# Patient Record
Sex: FEMALE | Race: WHITE | NOT HISPANIC OR LATINO | Employment: UNEMPLOYED | URBAN - METROPOLITAN AREA
[De-identification: names, ages, dates, MRNs, and addresses within clinical notes are randomized per-mention and may not be internally consistent; named-entity substitution may affect disease eponyms.]

---

## 2018-03-01 ENCOUNTER — OFFICE VISIT (OUTPATIENT)
Dept: FAMILY MEDICINE CLINIC | Facility: MEDICAL CENTER | Age: 33
End: 2018-03-01
Payer: COMMERCIAL

## 2018-03-01 ENCOUNTER — TRANSCRIBE ORDERS (OUTPATIENT)
Dept: ADMINISTRATIVE | Facility: HOSPITAL | Age: 33
End: 2018-03-01

## 2018-03-01 VITALS
WEIGHT: 128 LBS | DIASTOLIC BLOOD PRESSURE: 70 MMHG | RESPIRATION RATE: 16 BRPM | SYSTOLIC BLOOD PRESSURE: 100 MMHG | HEART RATE: 68 BPM

## 2018-03-01 DIAGNOSIS — R07.9 CHEST PAIN, UNSPECIFIED TYPE: Primary | ICD-10-CM

## 2018-03-01 DIAGNOSIS — H92.03 EAR PAIN, BILATERAL: ICD-10-CM

## 2018-03-01 DIAGNOSIS — R22.41 MASS OF LEG, RIGHT: ICD-10-CM

## 2018-03-01 DIAGNOSIS — R22.41 LEG MASS, RIGHT: Primary | ICD-10-CM

## 2018-03-01 PROBLEM — F41.8 DEPRESSION WITH ANXIETY: Status: ACTIVE | Noted: 2018-03-01

## 2018-03-01 PROBLEM — J30.89 ENVIRONMENTAL AND SEASONAL ALLERGIES: Status: ACTIVE | Noted: 2018-03-01

## 2018-03-01 PROCEDURE — 93000 ELECTROCARDIOGRAM COMPLETE: CPT | Performed by: FAMILY MEDICINE

## 2018-03-01 PROCEDURE — 99203 OFFICE O/P NEW LOW 30 MIN: CPT | Performed by: FAMILY MEDICINE

## 2018-03-01 RX ORDER — FLUOXETINE HYDROCHLORIDE 20 MG/1
30 CAPSULE ORAL
COMMUNITY
Start: 2018-02-17

## 2018-03-01 RX ORDER — FLUOXETINE 10 MG/1
CAPSULE ORAL
COMMUNITY
Start: 2018-02-17 | End: 2019-09-25 | Stop reason: HOSPADM

## 2018-03-01 RX ORDER — ALPRAZOLAM 1 MG/1
TABLET ORAL
COMMUNITY
Start: 2018-01-21

## 2018-03-01 NOTE — PROGRESS NOTES
Assessment/Plan:    No problem-specific Assessment & Plan notes found for this encounter  Diagnoses and all orders for this visit:    Chest pain, unspecified type  -     POCT ECG  -     Comprehensive metabolic panel; Future  -     Lipid Panel with Direct LDL reflex; Future  -     TSH, 3rd generation with T4 reflex; Future  -     Ambulatory referral to Cardiology; Future    Etiology unclear  In office EKG was not revealing  Check labs  Since patient is getting this pain which radiates to her left shoulder and neck while running I did recommend she see Cardiology for evaluation and she agreed  Ear pain, bilateral  Likely due to patient's underlying allergies  She is already on an oral antihistamine  I recommended she add a nasal steroid as well  Mass of leg, right  -     US MSK limited  Etiology unclear  Check ultrasound  May need to see surgery depending on ultrasound results  Other orders  -     FLUoxetine (PROzac) 10 mg capsule;   -     FLUoxetine (PROzac) 20 mg capsule;   -     ALPRAZolam (XANAX) 1 mg tablet; Follow-up in three months or sooner if needed  Subjective:      Patient ID: Darian Dempsey is a 29 y o  female  Patient presents to establish care  Has ear pain  Location is bilateral ears  Has been occurring for the past 1-2 months  She does take loratadine daily for allergies and does feel it helps  She is worried she has an infection  Denies sore throat  Denies cough  Denies runny nose  Has a lump on her right upper leg  She found it last night  It is small but painful  She states there is no skin changes but she can feel it when she presses down on her leg  Also has chest pain  Started about one year ago  It is centralized chest pain that goes into the left shoulder and up the neck  It only happens when she jogs  She is not jog all winter and therefore has not had the pain recently  Patient does smoke a few cigarettes per week  No history of heart troubles  Has not had labs lately  Patient is followed by Psychiatry for depression and anxiety  The following portions of the patient's history were reviewed and updated as appropriate: allergies, current medications, past family history, past medical history, past social history, past surgical history and problem list     Review of Systems   Constitutional: Negative for fever  Respiratory: Negative for shortness of breath  Cardiovascular: Positive for chest pain  Gastrointestinal: Negative for abdominal pain and blood in stool  Genitourinary: Negative for dysuria  Musculoskeletal: Negative for arthralgias and myalgias  Skin: Negative for rash  Neurological: Negative for headaches  Objective:      /70 (Cuff Size: Standard)   Pulse 68   Resp 16   Wt 58 1 kg (128 lb)          Physical Exam   Constitutional: She is oriented to person, place, and time  Vital signs are normal  She appears well-developed and well-nourished  HENT:   Head: Normocephalic and atraumatic  Right Ear: Tympanic membrane, external ear and ear canal normal    Left Ear: Tympanic membrane, external ear and ear canal normal    Nose: Mucosal edema and rhinorrhea present  No nasal deformity  Mouth/Throat: Uvula is midline and mucous membranes are normal    B/L TM air/fluid level visualized  Post Nasal Drainage  Eyes: Conjunctivae, EOM and lids are normal  Pupils are equal, round, and reactive to light  B/L Allergic Shiners  Neck: Trachea normal  Neck supple  No thyromegaly present  Cardiovascular: Normal rate, regular rhythm, S1 normal, S2 normal, normal heart sounds and normal pulses  No murmur heard  Pulmonary/Chest: Effort normal and breath sounds normal    Musculoskeletal:        Legs:  Lymphadenopathy:     She has no cervical adenopathy  Right cervical: No superficial cervical and no posterior cervical adenopathy present         Left cervical: No superficial cervical and no posterior cervical adenopathy present  Neurological: She is alert and oriented to person, place, and time  Skin: Skin is warm and dry     Psychiatric: Her speech is normal and behavior is normal  Judgment and thought content normal  Cognition and memory are normal

## 2018-03-06 ENCOUNTER — APPOINTMENT (OUTPATIENT)
Dept: LAB | Facility: MEDICAL CENTER | Age: 33
End: 2018-03-06
Payer: COMMERCIAL

## 2018-03-06 ENCOUNTER — HOSPITAL ENCOUNTER (OUTPATIENT)
Dept: RADIOLOGY | Facility: MEDICAL CENTER | Age: 33
Discharge: HOME | End: 2018-03-06
Payer: COMMERCIAL

## 2018-03-06 DIAGNOSIS — R07.9 CHEST PAIN, UNSPECIFIED TYPE: ICD-10-CM

## 2018-03-06 LAB
ALBUMIN SERPL BCP-MCNC: 4.3 G/DL (ref 3.5–5)
ALP SERPL-CCNC: 38 U/L (ref 46–116)
ALT SERPL W P-5'-P-CCNC: 16 U/L (ref 12–78)
ANION GAP SERPL CALCULATED.3IONS-SCNC: 6 MMOL/L (ref 4–13)
AST SERPL W P-5'-P-CCNC: 17 U/L (ref 5–45)
BILIRUB SERPL-MCNC: 0.48 MG/DL (ref 0.2–1)
BUN SERPL-MCNC: 13 MG/DL (ref 5–25)
CALCIUM SERPL-MCNC: 8.9 MG/DL (ref 8.3–10.1)
CHLORIDE SERPL-SCNC: 106 MMOL/L (ref 100–108)
CHOLEST SERPL-MCNC: 212 MG/DL (ref 50–200)
CO2 SERPL-SCNC: 26 MMOL/L (ref 21–32)
CREAT SERPL-MCNC: 0.73 MG/DL (ref 0.6–1.3)
GFR SERPL CREATININE-BSD FRML MDRD: 111 ML/MIN/1.73SQ M
GLUCOSE P FAST SERPL-MCNC: 83 MG/DL (ref 65–99)
HDLC SERPL-MCNC: 65 MG/DL (ref 40–60)
LDLC SERPL CALC-MCNC: 134 MG/DL (ref 0–100)
POTASSIUM SERPL-SCNC: 4 MMOL/L (ref 3.5–5.3)
PROT SERPL-MCNC: 7.6 G/DL (ref 6.4–8.2)
SODIUM SERPL-SCNC: 138 MMOL/L (ref 136–145)
TRIGL SERPL-MCNC: 66 MG/DL
TSH SERPL DL<=0.05 MIU/L-ACNC: 1.14 UIU/ML (ref 0.36–3.74)

## 2018-03-06 PROCEDURE — 80061 LIPID PANEL: CPT

## 2018-03-06 PROCEDURE — 36415 COLL VENOUS BLD VENIPUNCTURE: CPT

## 2018-03-06 PROCEDURE — 80053 COMPREHEN METABOLIC PANEL: CPT

## 2018-03-06 PROCEDURE — 84443 ASSAY THYROID STIM HORMONE: CPT

## 2018-03-06 PROCEDURE — 76705 ECHO EXAM OF ABDOMEN: CPT

## 2018-03-08 ENCOUNTER — TELEPHONE (OUTPATIENT)
Dept: FAMILY MEDICINE CLINIC | Facility: MEDICAL CENTER | Age: 33
End: 2018-03-08

## 2018-03-08 DIAGNOSIS — R22.41 MASS OF LEG, RIGHT: Primary | ICD-10-CM

## 2018-03-08 PROBLEM — E78.2 MIXED HYPERLIPIDEMIA: Status: ACTIVE | Noted: 2018-03-08

## 2018-03-08 NOTE — TELEPHONE ENCOUNTER
----- Message from Franklin Cowden, DO sent at 3/8/2018  8:29 AM EST -----  Right leg mass appears to be a lipoma based on ultrasound findings  We can monitor for now and if it continues to grow we can have patient see General surgery  If patient would rather have the mass removed sooner I can have her see General surgery later this month or next month

## 2018-04-09 ENCOUNTER — OFFICE VISIT (OUTPATIENT)
Dept: MULTI SPECIALTY CLINIC | Facility: CLINIC | Age: 33
End: 2018-04-09
Payer: COMMERCIAL

## 2018-04-09 VITALS
SYSTOLIC BLOOD PRESSURE: 110 MMHG | HEART RATE: 84 BPM | TEMPERATURE: 97.2 F | HEIGHT: 61 IN | DIASTOLIC BLOOD PRESSURE: 70 MMHG | BODY MASS INDEX: 24.47 KG/M2 | WEIGHT: 129.63 LBS

## 2018-04-09 DIAGNOSIS — R22.41 MASS OF LEG, RIGHT: Primary | ICD-10-CM

## 2018-04-09 PROCEDURE — 99202 OFFICE O/P NEW SF 15 MIN: CPT | Performed by: SURGERY

## 2018-04-09 NOTE — H&P
Office Visit - General Surgery  Tg Landa MRN: [id number]  Encounter: [id number]    Assessment and Plan    Problem List Items Addressed This Visit        Other    Mass of leg, right - Primary     Schedule for lipoma removal in OR under local anesthetic   Urgent procedure as this is impacting pt's day to day functioning  No pre-op testing needed as pt is healthy   Please call patient for scheduling                Chief Complaint:  Tg Landa is a 29 y o  female who presents for Mass of Right Leg    Subjective  Pt has had a small nodule in her upper right thigh for approximately 3 months  She states it causes her intermittent pain although doesn't affect her ability to walk  She is worried that the mass will continue to grow which is causing her stress and impacting her day to day functioning as she has anxiety and is on Prozac for it  She had an ultrasound of the lesions which showed a medial right thigh well-circumscribed hyperechoic intramuscular lesion measuring 1 3 x 1 1 x 0 5 cm  This is just deep to the skin surface suggestive of a lipoma  She wishes to have it removed  Past Medical History  Past Medical History:   Diagnosis Date    Preeclampsia        Past Surgical History  Past Surgical History:   Procedure Laterality Date    WISDOM TOOTH EXTRACTION         Family History  Family History   Problem Relation Age of Onset    Hyperlipidemia Mother     Hyperlipidemia Father     COPD Father        Medications  Current Outpatient Prescriptions on File Prior to Visit   Medication Sig Dispense Refill    ALPRAZolam (XANAX) 1 mg tablet       FLUoxetine (PROzac) 10 mg capsule       FLUoxetine (PROzac) 20 mg capsule        No current facility-administered medications on file prior to visit  Allergies  No Known Allergies    Review of Systems   Constitutional: Negative  HENT: Negative  Eyes: Negative  Cardiovascular: Negative  Gastrointestinal: Negative      Endocrine: Negative  Genitourinary: Negative  Musculoskeletal: Negative  Skin: Negative  Right thigh nodule    Allergic/Immunologic: Negative  Neurological: Negative  Hematological: Negative  Psychiatric/Behavioral: Negative  All other systems reviewed and are negative  Objective  Vitals:    04/09/18 0946   BP: 110/70   Pulse: 84   Temp: (!) 97 2 °F (36 2 °C)       Physical Exam   Constitutional: She appears well-developed and well-nourished  Cardiovascular: Normal rate, regular rhythm, normal heart sounds and intact distal pulses  Exam reveals no gallop and no friction rub  No murmur heard  Pulmonary/Chest: Effort normal and breath sounds normal  No respiratory distress  She has no wheezes  She has no rales  She exhibits no tenderness  Abdominal: Soft  Bowel sounds are normal  She exhibits no distension and no mass  There is no tenderness  There is no rebound and no guarding  Musculoskeletal: Normal range of motion  She exhibits tenderness  Small right upper thigh lipoma - mobile, slightly tender to palpation    Neurological: She is alert  Skin: Skin is warm and dry  She is not diaphoretic  Nursing note and vitals reviewed

## 2018-04-09 NOTE — PATIENT INSTRUCTIONS
Schedule for lipoma removal in OR under local anesthetic   Urgent procedure   No pre-op testing needed as pt is healthy   Risks - benefits and alternatives discussed   Consent for signed in the office   Please call patient for scheduling

## 2018-04-09 NOTE — ASSESSMENT & PLAN NOTE
Schedule for lipoma removal in OR under local anesthetic   Urgent procedure as this is impacting pt's day to day functioning  No pre-op testing needed as pt is healthy   Please call patient for scheduling

## 2018-04-09 NOTE — PROGRESS NOTES
Office Visit - General Surgery  Gerardo Riggs MRN: [id number]  Encounter: [id number]    Assessment and Plan    Problem List Items Addressed This Visit        Other    Mass of leg, right - Primary     Schedule for lipoma removal in OR under local anesthetic   Urgent procedure as this is impacting pt's day to day functioning  No pre-op testing needed as pt is healthy   Please call patient for scheduling                Chief Complaint:  Gerardo Riggs is a 29 y o  female who presents for Mass of Right Leg    Subjective  Pt has had a small nodule in her upper right thigh for approximately 3 months  She states it causes her intermittent pain although doesn't affect her ability to walk  She is worried that the mass will continue to grow which is causing her stress and impacting her day to day functioning as she has anxiety and is on Prozac for it  She had an ultrasound of the lesions which showed a medial right thigh well-circumscribed hyperechoic intramuscular lesion measuring 1 3 x 1 1 x 0 5 cm  This is just deep to the skin surface suggestive of a lipoma  She wishes to have it removed  Past Medical History  Past Medical History:   Diagnosis Date    Preeclampsia        Past Surgical History  Past Surgical History:   Procedure Laterality Date    WISDOM TOOTH EXTRACTION         Family History  Family History   Problem Relation Age of Onset    Hyperlipidemia Mother     Hyperlipidemia Father     COPD Father        Medications  Current Outpatient Prescriptions on File Prior to Visit   Medication Sig Dispense Refill    ALPRAZolam (XANAX) 1 mg tablet       FLUoxetine (PROzac) 10 mg capsule       FLUoxetine (PROzac) 20 mg capsule        No current facility-administered medications on file prior to visit  Allergies  No Known Allergies    Review of Systems   Constitutional: Negative  HENT: Negative  Eyes: Negative  Cardiovascular: Negative  Gastrointestinal: Negative      Endocrine: Negative  Genitourinary: Negative  Musculoskeletal: Negative  Skin: Negative  Right thigh nodule    Allergic/Immunologic: Negative  Neurological: Negative  Hematological: Negative  Psychiatric/Behavioral: Negative  All other systems reviewed and are negative  Objective  Vitals:    04/09/18 0946   BP: 110/70   Pulse: 84   Temp: (!) 97 2 °F (36 2 °C)       Physical Exam   Constitutional: She appears well-developed and well-nourished  Cardiovascular: Normal rate, regular rhythm, normal heart sounds and intact distal pulses  Exam reveals no gallop and no friction rub  No murmur heard  Pulmonary/Chest: Effort normal and breath sounds normal  No respiratory distress  She has no wheezes  She has no rales  She exhibits no tenderness  Abdominal: Soft  Bowel sounds are normal  She exhibits no distension and no mass  There is no tenderness  There is no rebound and no guarding  Musculoskeletal: Normal range of motion  She exhibits tenderness  Small right upper thigh lipoma - mobile, slightly tender to palpation    Neurological: She is alert  Skin: Skin is warm and dry  She is not diaphoretic  Nursing note and vitals reviewed

## 2018-04-11 ENCOUNTER — TELEPHONE (OUTPATIENT)
Dept: INTERNAL MEDICINE CLINIC | Facility: CLINIC | Age: 33
End: 2018-04-11

## 2018-04-11 NOTE — TELEPHONE ENCOUNTER
Spoke to pt and confirmed surgery date 4/24/2018 with Dr Anel Espinosa for Right Upper Thigh [place] Excision [place] [facility] [place]  Pt is aware the hospital will call the day before surgery to provide time  No testing needed

## 2018-04-11 NOTE — TELEPHONE ENCOUNTER
Pt is scheduled for surgery on 4/24/2018 with Dr Malcom Loza for [place] [facility] [place] [place]: [id number], D code: [id number] [phone]

## 2018-04-12 NOTE — TELEPHONE ENCOUNTER
This Cpt does require Auth:     Started Auth for this surgery  Pending auth number: [id number]    Faxed clinicals to [phone]  Waiting on approval Response

## 2018-04-16 ENCOUNTER — TELEPHONE (OUTPATIENT)
Dept: MULTI SPECIALTY CLINIC | Facility: CLINIC | Age: 33
End: 2018-04-16

## 2018-04-16 NOTE — TELEPHONE ENCOUNTER
Surgical Resident, Dr Lew Haley called [facility] and completed peer to peer  [employer] states not enough proof and still considers surgery to be cosmetic and not medically necessary  Per Dr Lew Haley, pt can return to the clinic in one month for a follow up to see if lipoma has enlarged  If enlarged, will try for surgery again  Called pt and informed pt of surgery still not being covered by ins  Pt agreed to follow up in one month  Pt is scheduled for follow up on 5/14/18 at 11:20AM   Lobo Woody at [address] [facility] and informed surgery has been cancelled  Also informed Santiago Guo at [employer]

## 2018-04-16 NOTE — TELEPHONE ENCOUNTER
PEER TO PEER REVIEW    Called on behalf of patient for peer to peer review for mass excision  Because mass is not functionally impairing was told that her insurance approval would be denied  I discussed my concern that I could not definitively say if this was malignant or not based on the ultrasound results and wanted to remove for formal pathology sampling  Again, my request was denied       In leiu of this, I will observe the patient for 1 month and have her return to clinic for re-evaluation

## 2018-04-16 NOTE — TELEPHONE ENCOUNTER
RECEIVED MESSAGE FROM ANTHONY AT [address] [facility] 04/13/18 3:31P   STATING AUTH HAS BEEN DENIED FOR THIS SURGERY     REASON FOR DENIAL IS NO DOCUMENTATION OF RE-OCCURENT INFECTION OR CHRONIC PAIN   NO DOCUMENTATION OF LESION AFFECTING RANGE OF MOTION OR FUNCTION OF MOBILITY     THEY CONSIDER SURGERY TO BE COSMETIC IN NATURE     IF GENERAL SURGERY WOULD LIKE TO DO PEER TO PEER:   [phone]  OPT 2, OPT 4      THEY ONLY GIVE 2 DAYS FOR PEER TO PEER, THEN A LETTER OF  APPEAL HAS TO BE SENT TO [address] [place] [address] [address] APPEALS DEPT IN [facility]

## 2018-06-27 NOTE — TELEPHONE ENCOUNTER
SURGERY CANCELLED- PER INS, NOT MEDICALLY NECESSARY  PT NEEDS APPT WITH SURGICAL CLINIC TO RE-EVALUATE   PT N/S FOR 5/14 APPT

## 2018-08-10 ENCOUNTER — OFFICE VISIT (OUTPATIENT)
Dept: FAMILY MEDICINE CLINIC | Facility: MEDICAL CENTER | Age: 33
End: 2018-08-10

## 2018-08-10 ENCOUNTER — TELEPHONE (OUTPATIENT)
Dept: FAMILY MEDICINE CLINIC | Facility: MEDICAL CENTER | Age: 33
End: 2018-08-10

## 2018-08-10 VITALS
OXYGEN SATURATION: 98 % | HEART RATE: 70 BPM | SYSTOLIC BLOOD PRESSURE: 110 MMHG | TEMPERATURE: 97.8 F | BODY MASS INDEX: 24.34 KG/M2 | RESPIRATION RATE: 18 BRPM | DIASTOLIC BLOOD PRESSURE: 68 MMHG | WEIGHT: 128.8 LBS

## 2018-08-10 DIAGNOSIS — J01.00 ACUTE NON-RECURRENT MAXILLARY SINUSITIS: Primary | ICD-10-CM

## 2018-08-10 PROCEDURE — 99212 OFFICE O/P EST SF 10 MIN: CPT | Performed by: FAMILY MEDICINE

## 2018-08-10 RX ORDER — LORATADINE 10 MG/1
10 TABLET ORAL DAILY
Status: ON HOLD | COMMUNITY
End: 2019-09-23 | Stop reason: ALTCHOICE

## 2018-08-10 RX ORDER — AMOXICILLIN 500 MG/1
500 TABLET, FILM COATED ORAL 3 TIMES DAILY
Qty: 30 TABLET | Refills: 0 | Status: SHIPPED | OUTPATIENT
Start: 2018-08-10 | End: 2018-08-20

## 2018-08-10 NOTE — PROGRESS NOTES
Assessment/Plan:    No problem-specific Assessment & Plan notes found for this encounter  Diagnoses and all orders for this visit:    Acute non-recurrent maxillary sinusitis  -     amoxicillin (AMOXIL) 500 MG tablet; Take 1 tablet (500 mg total) by mouth 3 (three) times a day for 10 days    Other orders  -     loratadine (CLARITIN) 10 mg tablet; Take 10 mg by mouth daily    Patient has a bilateral maxillary sinusitis which is likely bacterial and likely a complication of a viral URI  Will have her start a course of amoxicillin 500 mg 3 times daily for 10 days  Follow-up in one week if symptoms persist or sooner if needed  Subjective:      Patient ID: Viktor Mann is a 34 y o  female  Patient presents with two week history of sinus pain and pressure located in the cheeks, ear pain, cough, nasal congestion and sore throat  She is normally able to rid herself of any illnesses very quickly however this time her symptoms are lingering  She is concerned she may need an antibiotic  She does smoke occasionally  The following portions of the patient's history were reviewed and updated as appropriate: allergies, current medications and problem list     Review of Systems   Constitutional: Negative for fever  Respiratory: Negative for shortness of breath  Cardiovascular: Negative for chest pain  Objective:      /68 (BP Location: Left arm, Patient Position: Sitting, Cuff Size: Adult)   Pulse 70   Temp 97 8 °F (36 6 °C) (Oral)   Resp 18   Wt 58 4 kg (128 lb 12 8 oz)   SpO2 98%   BMI 24 34 kg/m²          Physical Exam   Constitutional: Vital signs are normal  She appears well-developed and well-nourished  HENT:   Head: Normocephalic and atraumatic  Right Ear: Tympanic membrane, external ear and ear canal normal    Left Ear: Tympanic membrane, external ear and ear canal normal    Nose: Mucosal edema and rhinorrhea present  Right sinus exhibits maxillary sinus tenderness  Right sinus exhibits no frontal sinus tenderness  Left sinus exhibits maxillary sinus tenderness  Left sinus exhibits no frontal sinus tenderness  Mouth/Throat: Uvula is midline and mucous membranes are normal    Post Nasal Drainage  Eyes: Conjunctivae, EOM and lids are normal  Pupils are equal, round, and reactive to light  Neck: Trachea normal    Cardiovascular: Normal rate, regular rhythm and normal heart sounds  No murmur heard  Pulmonary/Chest: Effort normal and breath sounds normal    Lymphadenopathy:     She has cervical adenopathy  Right cervical: Superficial cervical adenopathy present  Left cervical: Superficial cervical adenopathy present

## 2018-08-10 NOTE — TELEPHONE ENCOUNTER
Pt is currently uninsured  She has had a sore throat and ear pain for 2 weeks  Now has a productive cough and low grade fever    She is asking for an antibiotic to be called due to her current financially status

## 2018-08-10 NOTE — TELEPHONE ENCOUNTER
Unfortunately patient will need to be seen to determine if antibiotics are appropriate  Please note that if patient does decided to come here for office visit that does not mean she will get antibiotics if it is not appropriate

## 2019-01-14 ENCOUNTER — TELEPHONE (OUTPATIENT)
Dept: FAMILY MEDICINE CLINIC | Facility: MEDICAL CENTER | Age: 34
End: 2019-01-14

## 2019-01-16 ENCOUNTER — OFFICE VISIT (OUTPATIENT)
Dept: URGENT CARE | Facility: MEDICAL CENTER | Age: 34
End: 2019-01-16
Payer: COMMERCIAL

## 2019-01-16 VITALS
WEIGHT: 127 LBS | HEIGHT: 61 IN | HEART RATE: 108 BPM | OXYGEN SATURATION: 97 % | BODY MASS INDEX: 23.98 KG/M2 | SYSTOLIC BLOOD PRESSURE: 87 MMHG | RESPIRATION RATE: 20 BRPM | TEMPERATURE: 100.8 F | DIASTOLIC BLOOD PRESSURE: 64 MMHG

## 2019-01-16 DIAGNOSIS — J10.1 FLU DUE TO OTH IDENT INFLUENZA VIRUS W OTH RESP MANIFEST: Primary | ICD-10-CM

## 2019-01-16 DIAGNOSIS — J45.901 ASTHMA WITH ACUTE EXACERBATION, UNSPECIFIED ASTHMA SEVERITY, UNSPECIFIED WHETHER PERSISTENT: ICD-10-CM

## 2019-01-16 PROCEDURE — G0382 LEV 3 HOSP TYPE B ED VISIT: HCPCS | Performed by: FAMILY MEDICINE

## 2019-01-16 RX ORDER — BENZONATATE 200 MG/1
200 CAPSULE ORAL 3 TIMES DAILY PRN
Qty: 20 CAPSULE | Refills: 0 | Status: SHIPPED | OUTPATIENT
Start: 2019-01-16 | End: 2019-01-21 | Stop reason: ALTCHOICE

## 2019-01-16 RX ORDER — ALBUTEROL SULFATE 90 UG/1
2 AEROSOL, METERED RESPIRATORY (INHALATION) EVERY 6 HOURS PRN
Qty: 18 G | Refills: 0 | Status: SHIPPED | OUTPATIENT
Start: 2019-01-16 | End: 2019-01-21 | Stop reason: ALTCHOICE

## 2019-01-16 RX ORDER — IBUPROFEN 800 MG/1
800 TABLET ORAL EVERY 8 HOURS PRN
Qty: 30 TABLET | Refills: 0 | Status: SHIPPED | OUTPATIENT
Start: 2019-01-16 | End: 2019-03-08

## 2019-01-16 NOTE — PROGRESS NOTES
[facility]        NAME: Demi Alberts is a 34 y o  female  : 1985    MRN: [id number]  DATE: 2019  TIME: 11:22 AM    Assessment and Plan   Flu due to oth ident influenza virus w oth resp manifest [J10 1]  1  Flu due to oth ident influenza virus w oth resp manifest  ibuprofen (MOTRIN) 800 mg tablet    benzonatate (TESSALON) 200 MG capsule   2  Asthma with acute exacerbation, unspecified asthma severity, unspecified whether persistent  albuterol (VENTOLIN HFA) 90 mcg/act inhaler     Based on pt's appearance and description of body aches, I suspect the flu  As pt has had sx for over 24 hours, I do not think Tamiflu will be effective and advised Ibuprofen, Tessalon, and inhaler for symptoms  Patient Instructions     Ibuprofen prescription as directed  Benzonatate cough prescription as directed  Albuterol inhaler as directed  Take your Mucinex Cold and Flu as directed  Increase fluids  Follow up with PCP in 3-5 days  Proceed to  ER if symptoms worsen  Chief Complaint     Chief Complaint   Patient presents with    Cough     3 days with chest congestion and body aches/afebrile         History of Present Illness       Pt is a 34 yr old female reporting she developed a cough followed by body aches 2 days ago  She has associated sinus congestion, ST, BALDERAS  She reports the body aches feel komal "lighting" in her body  She denies any fevers at home however, has a temp of 100 8 here  She is an asthmatic and reports intermittent chest tightness and wheezing  No N/V/D  She had taken Theraflu with no relief  She also has Mucinex Cold and Flu at home but has not taken it yet  Review of Systems   Review of Systems   Constitutional: Positive for appetite change (decreased), diaphoresis and fatigue  HENT: Positive for congestion, postnasal drip, rhinorrhea, sinus pressure and sore throat  Negative for ear pain, sinus pain and voice change  Eyes: Negative for redness  Respiratory: Positive for cough, chest tightness and wheezing  Negative for shortness of breath  Gastrointestinal: Negative for abdominal pain, constipation, diarrhea, nausea and vomiting  Musculoskeletal: Positive for arthralgias and myalgias  Skin: Negative for rash  Neurological: Positive for headaches  Current Medications       Current Outpatient Prescriptions:     ALPRAZolam (XANAX) 1 mg tablet, , Disp: , Rfl:     FLUoxetine (PROzac) 10 mg capsule, , Disp: , Rfl:     FLUoxetine (PROzac) 20 mg capsule, , Disp: , Rfl:     loratadine (CLARITIN) 10 mg tablet, Take 10 mg by mouth daily, Disp: , Rfl:     albuterol (VENTOLIN HFA) 90 mcg/act inhaler, Inhale 2 puffs every 6 (six) hours as needed for wheezing, Disp: 18 g, Rfl: 0    benzonatate (TESSALON) 200 MG capsule, Take 1 capsule (200 mg total) by mouth 3 (three) times a day as needed for cough, Disp: 20 capsule, Rfl: 0    ibuprofen (MOTRIN) 800 mg tablet, Take 1 tablet (800 mg total) by mouth every 8 (eight) hours as needed for mild pain, fever or headaches, Disp: 30 tablet, Rfl: 0    Current Allergies     Allergies as of 01/16/2019    (No Known Allergies)            The following portions of the patient's history were reviewed and updated as appropriate: allergies, current medications, past family history, past medical history, past social history, past surgical history and problem list      Past Medical History:   Diagnosis Date    Preeclampsia        Past Surgical History:   Procedure Laterality Date    WISDOM TOOTH EXTRACTION         Family History   Problem Relation Age of Onset    Hyperlipidemia Mother     Hyperlipidemia Father     COPD Father          Medications have been verified          Objective   BP (!) 87/64   Pulse (!) 108   Temp (!) 100 8 °F (38 2 °C)   Resp 20   Ht 5' 1" (1 549 m)   Wt 57 6 kg (127 lb)   SpO2 97%   BMI 24 00 kg/m²        Physical Exam     Physical Exam   Constitutional: She is oriented to person, place, and time  She appears well-nourished  Non-toxic appearance  She appears ill  No distress  HENT:   Head: Normocephalic and atraumatic  Right Ear: Hearing, tympanic membrane, external ear and ear canal normal    Left Ear: Hearing, tympanic membrane, external ear and ear canal normal    Nose: Nose normal    Mouth/Throat: Uvula is midline, oropharynx is clear and moist and mucous membranes are normal  No oropharyngeal exudate  Eyes: Conjunctivae are normal    Cardiovascular: Regular rhythm and normal heart sounds  Tachycardia present  Pulmonary/Chest: Effort normal and breath sounds normal  No respiratory distress  She has no wheezes  She has no rales  She exhibits no tenderness  Neurological: She is alert and oriented to person, place, and time  Skin: Skin is warm  She is diaphoretic  Psychiatric: She has a normal mood and affect   Her behavior is normal  Judgment and thought content normal

## 2019-01-16 NOTE — PATIENT INSTRUCTIONS
Ibuprofen prescription as directed  Benzonatate cough prescription as directed  Albuterol inhaler as directed  Take your Mucinex Cold and Flu as directed  Increase fluids  Follow up with PCP in 3-5 days  Proceed to  ER if symptoms worsen

## 2019-01-21 ENCOUNTER — OFFICE VISIT (OUTPATIENT)
Dept: FAMILY MEDICINE CLINIC | Facility: MEDICAL CENTER | Age: 34
End: 2019-01-21
Payer: COMMERCIAL

## 2019-01-21 VITALS
HEART RATE: 77 BPM | DIASTOLIC BLOOD PRESSURE: 70 MMHG | WEIGHT: 131.8 LBS | TEMPERATURE: 97.6 F | OXYGEN SATURATION: 98 % | BODY MASS INDEX: 24.9 KG/M2 | SYSTOLIC BLOOD PRESSURE: 110 MMHG

## 2019-01-21 DIAGNOSIS — J06.9 UPPER RESPIRATORY TRACT INFECTION, UNSPECIFIED TYPE: Primary | ICD-10-CM

## 2019-01-21 DIAGNOSIS — Z23 ENCOUNTER FOR IMMUNIZATION: ICD-10-CM

## 2019-01-21 PROCEDURE — 99213 OFFICE O/P EST LOW 20 MIN: CPT | Performed by: FAMILY MEDICINE

## 2019-01-21 PROCEDURE — 90471 IMMUNIZATION ADMIN: CPT

## 2019-01-21 PROCEDURE — 90682 RIV4 VACC RECOMBINANT DNA IM: CPT

## 2019-01-21 NOTE — PROGRESS NOTES
Assessment/Plan:    No problem-specific Assessment & Plan notes found for this encounter  Diagnoses and all orders for this visit:    Upper respiratory tract infection, unspecified type  Likely viral   Patient is feeling better than last week  I discussed with her that it will take time to fully recover from her illness particularly if she actually had the flu  Symptomatic care for now  No antibiotics indicated at this time  Encounter for immunization  -     PREFERRED: influenza vaccine, [id number], quadrivalent, recombinant, PF, 0 5 mL (FLUBLOK)  Patient possibly having had the flu last week I encouraged her to get the flu vaccine to protect her for the remainder of the winter from other strains of the flu  Patient was agreeable  Vaccine given and tolerated well  Follow-up in two weeks if symptoms persist or sooner if needed  Subjective:      Patient ID: Nuria Herrera is a 34 y o  female  Patient presents with one-week history of sinus pain, ear pain, sore throat, cough, runny nose and fatigue  Seen at the urgent care last week on 1/16/2019 and diagnosed with possible influenza  She was having fevers as well but that was the last day she had a fever and was in the urgent care  Definitely feeling much better than she did then but still not feeling 100%  Worried she has a sinus infection  Did not get the flu vaccine this year          The following portions of the patient's history were reviewed and updated as appropriate:   She   Patient Active Problem List    Diagnosis Date Noted    Mass of leg, right 04/09/2018    Mixed hyperlipidemia 03/08/2018    Environmental and seasonal allergies 03/01/2018    Depression with anxiety 03/01/2018     Current Outpatient Prescriptions   Medication Sig Dispense Refill    FLUoxetine (PROzac) 10 mg capsule       FLUoxetine (PROzac) 20 mg capsule       ibuprofen (MOTRIN) 800 mg tablet Take 1 tablet (800 mg total) by mouth every 8 (eight) hours as needed for mild pain, fever or headaches 30 tablet 0    loratadine (CLARITIN) 10 mg tablet Take 10 mg by mouth daily      ALPRAZolam (XANAX) 1 mg tablet        No current facility-administered medications for this visit  She has No Known Allergies       Review of Systems   Constitutional: Negative for fever  Respiratory: Negative for shortness of breath  Cardiovascular: Negative for chest pain  Objective:      /70 (BP Location: Left arm, Patient Position: Sitting, Cuff Size: Adult)   Pulse 77   Temp 97 6 °F (36 4 °C)   Wt 59 8 kg (131 lb 12 8 oz)   SpO2 98%   BMI 24 90 kg/m²          Physical Exam   Constitutional: Vital signs are normal  She appears well-developed and well-nourished  HENT:   Head: Normocephalic and atraumatic  Right Ear: Tympanic membrane, external ear and ear canal normal    Left Ear: Tympanic membrane, external ear and ear canal normal    Nose: Rhinorrhea present  No mucosal edema  Mouth/Throat: Uvula is midline and mucous membranes are normal    Post Nasal Drainage  Eyes: Pupils are equal, round, and reactive to light  Conjunctivae, EOM and lids are normal    Neck: Trachea normal    Cardiovascular: Normal rate, regular rhythm and normal heart sounds  No murmur heard  Pulmonary/Chest: Effort normal and breath sounds normal    Lymphadenopathy:     She has cervical adenopathy  Right cervical: Superficial cervical adenopathy present  Left cervical: Superficial cervical adenopathy present

## 2019-02-14 ENCOUNTER — TRANSCRIBE ORDERS (OUTPATIENT)
Dept: OBGYN CLINIC | Facility: CLINIC | Age: 34
End: 2019-02-14

## 2019-02-14 DIAGNOSIS — Z32.00 ENCOUNTER FOR PREGNANCY TEST: Primary | ICD-10-CM

## 2019-02-20 ENCOUNTER — APPOINTMENT (OUTPATIENT)
Dept: LAB | Facility: MEDICAL CENTER | Age: 34
End: 2019-02-20
Payer: COMMERCIAL

## 2019-02-20 DIAGNOSIS — Z32.00 ENCOUNTER FOR PREGNANCY TEST: ICD-10-CM

## 2019-02-20 LAB — B-HCG SERPL-ACNC: 9971 MIU/ML

## 2019-02-20 PROCEDURE — 84702 CHORIONIC GONADOTROPIN TEST: CPT

## 2019-02-20 PROCEDURE — 36415 COLL VENOUS BLD VENIPUNCTURE: CPT

## 2019-03-08 ENCOUNTER — INITIAL PRENATAL (OUTPATIENT)
Dept: OBGYN CLINIC | Facility: CLINIC | Age: 34
End: 2019-03-08
Payer: COMMERCIAL

## 2019-03-08 VITALS — WEIGHT: 125 LBS | BODY MASS INDEX: 23.6 KG/M2 | HEIGHT: 61 IN

## 2019-03-08 DIAGNOSIS — Z34.81 PRENATAL CARE, SUBSEQUENT PREGNANCY IN FIRST TRIMESTER: Primary | ICD-10-CM

## 2019-03-08 PROCEDURE — T1001 NURSING ASSESSMENT/EVALUATN: HCPCS | Performed by: PHYSICIAN ASSISTANT

## 2019-03-21 ENCOUNTER — INITIAL PRENATAL (OUTPATIENT)
Dept: OBGYN CLINIC | Facility: CLINIC | Age: 34
End: 2019-03-21
Payer: COMMERCIAL

## 2019-03-21 VITALS — WEIGHT: 124 LBS | SYSTOLIC BLOOD PRESSURE: 100 MMHG | DIASTOLIC BLOOD PRESSURE: 60 MMHG | BODY MASS INDEX: 23.43 KG/M2

## 2019-03-21 DIAGNOSIS — Z11.3 SCREEN FOR STD (SEXUALLY TRANSMITTED DISEASE): ICD-10-CM

## 2019-03-21 DIAGNOSIS — Z12.4 ROUTINE CERVICAL SMEAR: ICD-10-CM

## 2019-03-21 DIAGNOSIS — Z11.51 SCREENING FOR HPV (HUMAN PAPILLOMAVIRUS): ICD-10-CM

## 2019-03-21 DIAGNOSIS — Z3A.10 10 WEEKS GESTATION OF PREGNANCY: Primary | ICD-10-CM

## 2019-03-21 DIAGNOSIS — Z11.8 SCREENING FOR CHLAMYDIAL DISEASE: ICD-10-CM

## 2019-03-21 PROCEDURE — 76801 OB US < 14 WKS SINGLE FETUS: CPT | Performed by: OBSTETRICS & GYNECOLOGY

## 2019-03-21 PROCEDURE — 99213 OFFICE O/P EST LOW 20 MIN: CPT | Performed by: OBSTETRICS & GYNECOLOGY

## 2019-03-21 RX ORDER — FOLIC ACID 1 MG/1
1 TABLET ORAL DAILY
Qty: 90 TABLET | Refills: 3 | Status: SHIPPED | OUTPATIENT
Start: 2019-03-21 | End: 2019-09-25 | Stop reason: HOSPADM

## 2019-03-21 NOTE — PROGRESS NOTES
1st OB-  Pap w/ HPV and GC/Chlamydia  Has minimal discharge  Sometimes has itching  No burning  No bleeding  Has occasional mild cramping  Has nausea, vomiting and headaches every day- frequent but mild

## 2019-03-21 NOTE — PROGRESS NOTES
Pt taking 30 mg of Prozac daily  Advised not to take Xanax during pregnancy    at 36 due to preeclampsia  3 lbs 7 oz  2 weeks in NICU  Never needed oxygen    at term  5 lbs 13 oz  Pt quit smoking in January  CRL 10w1 on ultrasound  Pt interested in early testing, referral placed to  center  Pap and culture done today  Rx Folic acid 1 mg daily  Vitamin D 2000 IU daily

## 2019-03-22 ENCOUNTER — TELEPHONE (OUTPATIENT)
Dept: PERINATAL CARE | Facility: CLINIC | Age: 34
End: 2019-03-22

## 2019-03-22 NOTE — TELEPHONE ENCOUNTER
Spoke with patient, had multiple questions concerning appointments at [facility] and what blood testing is for  Reviewed NT scan and Sequential blood screenings information as well as timing of appointments  Patient verbalized understanding and   calling patient with appointment availability

## 2019-03-27 LAB
DEPRECATED C TRACH RRNA XXX QL PRB: NOT DETECTED
HPV HR 12 DNA CVX QL NAA+PROBE: NOT DETECTED
HPV16 DNA SPEC QL NAA+PROBE: NOT DETECTED
HPV18 DNA SPEC QL NAA+PROBE: NOT DETECTED
N GONORRHOEA DNA UR QL NAA+PROBE: NOT DETECTED
THIN PREP CVX: NORMAL

## 2019-04-05 ENCOUNTER — ROUTINE PRENATAL (OUTPATIENT)
Dept: PERINATAL CARE | Facility: MEDICAL CENTER | Age: 34
End: 2019-04-05
Payer: COMMERCIAL

## 2019-04-05 VITALS
HEIGHT: 61 IN | HEART RATE: 77 BPM | BODY MASS INDEX: 24.66 KG/M2 | SYSTOLIC BLOOD PRESSURE: 110 MMHG | WEIGHT: 130.6 LBS | DIASTOLIC BLOOD PRESSURE: 74 MMHG

## 2019-04-05 DIAGNOSIS — Z3A.12 12 WEEKS GESTATION OF PREGNANCY: ICD-10-CM

## 2019-04-05 DIAGNOSIS — O09.299 HX OF PREECLAMPSIA, PRIOR PREGNANCY, CURRENTLY PREGNANT: Primary | ICD-10-CM

## 2019-04-05 DIAGNOSIS — Z36.82 ENCOUNTER FOR ANTENATAL SCREENING FOR NUCHAL TRANSLUCENCY: ICD-10-CM

## 2019-04-05 PROCEDURE — 76813 OB US NUCHAL MEAS 1 GEST: CPT | Performed by: OBSTETRICS & GYNECOLOGY

## 2019-04-05 PROCEDURE — 99242 OFF/OP CONSLTJ NEW/EST SF 20: CPT | Performed by: OBSTETRICS & GYNECOLOGY

## 2019-04-16 ENCOUNTER — TELEPHONE (OUTPATIENT)
Dept: PERINATAL CARE | Facility: CLINIC | Age: 34
End: 2019-04-16

## 2019-04-18 ENCOUNTER — ROUTINE PRENATAL (OUTPATIENT)
Dept: OBGYN CLINIC | Facility: CLINIC | Age: 34
End: 2019-04-18
Payer: COMMERCIAL

## 2019-04-18 VITALS — BODY MASS INDEX: 25.04 KG/M2 | SYSTOLIC BLOOD PRESSURE: 110 MMHG | WEIGHT: 132.5 LBS | DIASTOLIC BLOOD PRESSURE: 72 MMHG

## 2019-04-18 DIAGNOSIS — Z36.9 ANTENATAL SCREENING ENCOUNTER: ICD-10-CM

## 2019-04-18 DIAGNOSIS — Z34.82 PRENATAL CARE, SUBSEQUENT PREGNANCY IN SECOND TRIMESTER: Primary | ICD-10-CM

## 2019-04-18 PROCEDURE — 99213 OFFICE O/P EST LOW 20 MIN: CPT | Performed by: PHYSICIAN ASSISTANT

## 2019-05-02 ENCOUNTER — TRANSCRIBE ORDERS (OUTPATIENT)
Dept: ADMINISTRATIVE | Facility: HOSPITAL | Age: 34
End: 2019-05-02

## 2019-05-02 ENCOUNTER — APPOINTMENT (OUTPATIENT)
Dept: LAB | Facility: MEDICAL CENTER | Age: 34
End: 2019-05-02
Payer: COMMERCIAL

## 2019-05-02 DIAGNOSIS — Z36.9 ANTENATAL SCREENING ENCOUNTER: ICD-10-CM

## 2019-05-02 DIAGNOSIS — Z33.1 PREGNANT STATE, INCIDENTAL: ICD-10-CM

## 2019-05-02 DIAGNOSIS — Z36.9 UNSPECIFIED ANTENATAL SCREENING: Primary | ICD-10-CM

## 2019-05-02 DIAGNOSIS — Z3A.10 10 WEEKS GESTATION OF PREGNANCY: ICD-10-CM

## 2019-05-02 DIAGNOSIS — Z36.9 UNSPECIFIED ANTENATAL SCREENING: ICD-10-CM

## 2019-05-02 LAB
ABO GROUP BLD: NORMAL
BASOPHILS # BLD AUTO: 0.02 THOUSANDS/ΜL (ref 0–0.1)
BASOPHILS NFR BLD AUTO: 0 % (ref 0–1)
BILIRUB UR QL STRIP: NEGATIVE
BLD GP AB SCN SERPL QL: NEGATIVE
CLARITY UR: CLEAR
COLOR UR: YELLOW
EOSINOPHIL # BLD AUTO: 0.18 THOUSAND/ΜL (ref 0–0.61)
EOSINOPHIL NFR BLD AUTO: 3 % (ref 0–6)
ERYTHROCYTE [DISTWIDTH] IN BLOOD BY AUTOMATED COUNT: 12.9 % (ref 11.6–15.1)
GLUCOSE UR STRIP-MCNC: NEGATIVE MG/DL
HBV SURFACE AG SER QL: NORMAL
HCT VFR BLD AUTO: 38 % (ref 34.8–46.1)
HGB BLD-MCNC: 12.3 G/DL (ref 11.5–15.4)
HGB UR QL STRIP.AUTO: NEGATIVE
IMM GRANULOCYTES # BLD AUTO: 0.03 THOUSAND/UL (ref 0–0.2)
IMM GRANULOCYTES NFR BLD AUTO: 0 % (ref 0–2)
KETONES UR STRIP-MCNC: NEGATIVE MG/DL
LEUKOCYTE ESTERASE UR QL STRIP: NEGATIVE
LYMPHOCYTES # BLD AUTO: 1.59 THOUSANDS/ΜL (ref 0.6–4.47)
LYMPHOCYTES NFR BLD AUTO: 23 % (ref 14–44)
MCH RBC QN AUTO: 28.1 PG (ref 26.8–34.3)
MCHC RBC AUTO-ENTMCNC: 32.4 G/DL (ref 31.4–37.4)
MCV RBC AUTO: 87 FL (ref 82–98)
MONOCYTES # BLD AUTO: 0.35 THOUSAND/ΜL (ref 0.17–1.22)
MONOCYTES NFR BLD AUTO: 5 % (ref 4–12)
NEUTROPHILS # BLD AUTO: 4.83 THOUSANDS/ΜL (ref 1.85–7.62)
NEUTS SEG NFR BLD AUTO: 69 % (ref 43–75)
NITRITE UR QL STRIP: NEGATIVE
NRBC BLD AUTO-RTO: 0 /100 WBCS
PH UR STRIP.AUTO: 7 [PH]
PLATELET # BLD AUTO: 181 THOUSANDS/UL (ref 149–390)
PMV BLD AUTO: 10.1 FL (ref 8.9–12.7)
PROT UR STRIP-MCNC: NEGATIVE MG/DL
RBC # BLD AUTO: 4.37 MILLION/UL (ref 3.81–5.12)
RH BLD: POSITIVE
RUBV IGG SERPL IA-ACNC: 120.9 IU/ML
SP GR UR STRIP.AUTO: 1.01 (ref 1–1.03)
SPECIMEN EXPIRATION DATE: NORMAL
UROBILINOGEN UR QL STRIP.AUTO: 0.2 E.U./DL
WBC # BLD AUTO: 7 THOUSAND/UL (ref 4.31–10.16)

## 2019-05-02 PROCEDURE — 81003 URINALYSIS AUTO W/O SCOPE: CPT

## 2019-05-02 PROCEDURE — 87086 URINE CULTURE/COLONY COUNT: CPT

## 2019-05-02 PROCEDURE — 36415 COLL VENOUS BLD VENIPUNCTURE: CPT

## 2019-05-02 PROCEDURE — 80081 OBSTETRIC PANEL INC HIV TSTG: CPT

## 2019-05-03 LAB
BACTERIA UR CULT: NORMAL
HIV 1+2 AB+HIV1 P24 AG SERPL QL IA: NORMAL
RPR SER QL: NORMAL
SCAN RESULT: NORMAL

## 2019-05-08 ENCOUNTER — TELEPHONE (OUTPATIENT)
Dept: PERINATAL CARE | Facility: CLINIC | Age: 34
End: 2019-05-08

## 2019-05-16 ENCOUNTER — ROUTINE PRENATAL (OUTPATIENT)
Dept: OBGYN CLINIC | Facility: CLINIC | Age: 34
End: 2019-05-16
Payer: COMMERCIAL

## 2019-05-16 VITALS — BODY MASS INDEX: 23.24 KG/M2 | DIASTOLIC BLOOD PRESSURE: 70 MMHG | SYSTOLIC BLOOD PRESSURE: 100 MMHG | WEIGHT: 123 LBS

## 2019-05-16 DIAGNOSIS — Z3A.18 18 WEEKS GESTATION OF PREGNANCY: Primary | ICD-10-CM

## 2019-05-16 PROCEDURE — 99213 OFFICE O/P EST LOW 20 MIN: CPT | Performed by: OBSTETRICS & GYNECOLOGY

## 2019-05-16 RX ORDER — ASPIRIN 81 MG/1
81 TABLET ORAL DAILY
Status: ON HOLD | COMMUNITY
End: 2019-09-23 | Stop reason: ALTCHOICE

## 2019-05-31 ENCOUNTER — ROUTINE PRENATAL (OUTPATIENT)
Dept: PERINATAL CARE | Facility: MEDICAL CENTER | Age: 34
End: 2019-05-31
Payer: COMMERCIAL

## 2019-05-31 VITALS
DIASTOLIC BLOOD PRESSURE: 67 MMHG | SYSTOLIC BLOOD PRESSURE: 106 MMHG | WEIGHT: 139.2 LBS | HEIGHT: 61 IN | BODY MASS INDEX: 26.28 KG/M2 | HEART RATE: 80 BPM

## 2019-05-31 DIAGNOSIS — O09.292 HX OF PREECLAMPSIA, PRIOR PREGNANCY, CURRENTLY PREGNANT, SECOND TRIMESTER: Primary | ICD-10-CM

## 2019-05-31 DIAGNOSIS — O09.292 PREVIOUS PREGNANCY COMPLICATED BY INTRAUTERINE GROWTH RESTRICTION, ANTEPARTUM, SECOND TRIMESTER: ICD-10-CM

## 2019-05-31 DIAGNOSIS — Z36.3 ENCOUNTER FOR ANTENATAL SCREENING FOR MALFORMATIONS: ICD-10-CM

## 2019-05-31 DIAGNOSIS — O99.342 DEPRESSION AFFECTING PREGNANCY IN SECOND TRIMESTER, ANTEPARTUM: ICD-10-CM

## 2019-05-31 DIAGNOSIS — Z3A.20 20 WEEKS GESTATION OF PREGNANCY: ICD-10-CM

## 2019-05-31 DIAGNOSIS — F32.A DEPRESSION AFFECTING PREGNANCY IN SECOND TRIMESTER, ANTEPARTUM: ICD-10-CM

## 2019-05-31 PROCEDURE — 76805 OB US >/= 14 WKS SNGL FETUS: CPT | Performed by: OBSTETRICS & GYNECOLOGY

## 2019-05-31 PROCEDURE — 99212 OFFICE O/P EST SF 10 MIN: CPT | Performed by: OBSTETRICS & GYNECOLOGY

## 2019-06-13 ENCOUNTER — ROUTINE PRENATAL (OUTPATIENT)
Dept: OBGYN CLINIC | Facility: CLINIC | Age: 34
End: 2019-06-13
Payer: COMMERCIAL

## 2019-06-13 VITALS — SYSTOLIC BLOOD PRESSURE: 112 MMHG | DIASTOLIC BLOOD PRESSURE: 72 MMHG | WEIGHT: 142.5 LBS | BODY MASS INDEX: 26.93 KG/M2

## 2019-06-13 DIAGNOSIS — Z34.82 PRENATAL CARE, SUBSEQUENT PREGNANCY IN SECOND TRIMESTER: Primary | ICD-10-CM

## 2019-06-13 DIAGNOSIS — Z36.9 ANTENATAL SCREENING ENCOUNTER: ICD-10-CM

## 2019-06-13 PROCEDURE — 99213 OFFICE O/P EST LOW 20 MIN: CPT | Performed by: PHYSICIAN ASSISTANT

## 2019-07-11 ENCOUNTER — APPOINTMENT (OUTPATIENT)
Dept: LAB | Facility: MEDICAL CENTER | Age: 34
End: 2019-07-11
Payer: COMMERCIAL

## 2019-07-11 DIAGNOSIS — Z36.9 ANTENATAL SCREENING ENCOUNTER: ICD-10-CM

## 2019-07-11 LAB
BASOPHILS # BLD AUTO: 0.02 THOUSANDS/ΜL (ref 0–0.1)
BASOPHILS NFR BLD AUTO: 0 % (ref 0–1)
EOSINOPHIL # BLD AUTO: 0.13 THOUSAND/ΜL (ref 0–0.61)
EOSINOPHIL NFR BLD AUTO: 2 % (ref 0–6)
ERYTHROCYTE [DISTWIDTH] IN BLOOD BY AUTOMATED COUNT: 12.6 % (ref 11.6–15.1)
GLUCOSE 1H P 50 G GLC PO SERPL-MCNC: 89 MG/DL
HCT VFR BLD AUTO: 33.8 % (ref 34.8–46.1)
HGB BLD-MCNC: 10.5 G/DL (ref 11.5–15.4)
IMM GRANULOCYTES # BLD AUTO: 0.13 THOUSAND/UL (ref 0–0.2)
IMM GRANULOCYTES NFR BLD AUTO: 2 % (ref 0–2)
LYMPHOCYTES # BLD AUTO: 1.51 THOUSANDS/ΜL (ref 0.6–4.47)
LYMPHOCYTES NFR BLD AUTO: 18 % (ref 14–44)
MCH RBC QN AUTO: 26.9 PG (ref 26.8–34.3)
MCHC RBC AUTO-ENTMCNC: 31.1 G/DL (ref 31.4–37.4)
MCV RBC AUTO: 87 FL (ref 82–98)
MONOCYTES # BLD AUTO: 0.56 THOUSAND/ΜL (ref 0.17–1.22)
MONOCYTES NFR BLD AUTO: 7 % (ref 4–12)
NEUTROPHILS # BLD AUTO: 5.98 THOUSANDS/ΜL (ref 1.85–7.62)
NEUTS SEG NFR BLD AUTO: 71 % (ref 43–75)
NRBC BLD AUTO-RTO: 0 /100 WBCS
PLATELET # BLD AUTO: 158 THOUSANDS/UL (ref 149–390)
PMV BLD AUTO: 10.5 FL (ref 8.9–12.7)
RBC # BLD AUTO: 3.9 MILLION/UL (ref 3.81–5.12)
WBC # BLD AUTO: 8.33 THOUSAND/UL (ref 4.31–10.16)

## 2019-07-11 PROCEDURE — 82950 GLUCOSE TEST: CPT

## 2019-07-11 PROCEDURE — 36415 COLL VENOUS BLD VENIPUNCTURE: CPT

## 2019-07-11 PROCEDURE — 86592 SYPHILIS TEST NON-TREP QUAL: CPT

## 2019-07-11 PROCEDURE — 85025 COMPLETE CBC W/AUTO DIFF WBC: CPT

## 2019-07-12 LAB — RPR SER QL: NORMAL

## 2019-07-19 ENCOUNTER — ULTRASOUND (OUTPATIENT)
Dept: PERINATAL CARE | Facility: CLINIC | Age: 34
End: 2019-07-19
Payer: COMMERCIAL

## 2019-07-19 VITALS
DIASTOLIC BLOOD PRESSURE: 68 MMHG | BODY MASS INDEX: 27.34 KG/M2 | WEIGHT: 144.8 LBS | SYSTOLIC BLOOD PRESSURE: 100 MMHG | HEART RATE: 86 BPM | HEIGHT: 61 IN

## 2019-07-19 DIAGNOSIS — Z3A.27 27 WEEKS GESTATION OF PREGNANCY: ICD-10-CM

## 2019-07-19 DIAGNOSIS — O09.292 PREVIOUS PREGNANCY COMPLICATED BY INTRAUTERINE GROWTH RESTRICTION, ANTEPARTUM, SECOND TRIMESTER: Primary | ICD-10-CM

## 2019-07-19 PROCEDURE — 76816 OB US FOLLOW-UP PER FETUS: CPT | Performed by: OBSTETRICS & GYNECOLOGY

## 2019-07-20 NOTE — PROGRESS NOTES
Ultrasound shows normal interval fetal growth and fluid  Recommend a follow-up ultrasound for growth in six weeks secondary to her history of a prior baby with growth restriction      Cl Lopez MD

## 2019-07-23 ENCOUNTER — ROUTINE PRENATAL (OUTPATIENT)
Dept: OBGYN CLINIC | Facility: CLINIC | Age: 34
End: 2019-07-23
Payer: COMMERCIAL

## 2019-07-23 VITALS — SYSTOLIC BLOOD PRESSURE: 100 MMHG | DIASTOLIC BLOOD PRESSURE: 70 MMHG | BODY MASS INDEX: 26.83 KG/M2 | WEIGHT: 142 LBS

## 2019-07-23 DIAGNOSIS — Z3A.28 28 WEEKS GESTATION OF PREGNANCY: Primary | ICD-10-CM

## 2019-07-23 PROCEDURE — 99213 OFFICE O/P EST LOW 20 MIN: CPT | Performed by: OBSTETRICS & GYNECOLOGY

## 2019-07-23 NOTE — PROGRESS NOTES
Patient here for 28 week visit  1 hr glucola test normal  Hemoglobin 10 5  Encouraged patient to take prenatal daily  No n/v, bleeding, cramping  Patient is having trouble breathing  She states she feels like her heart is beating fast all of the time  Heart and lung exam normal    No history of asthma  She does have history of allergies  Discussed zyrtec if needed  Plan- follow up in two weeks

## 2019-08-21 ENCOUNTER — ROUTINE PRENATAL (OUTPATIENT)
Dept: OBGYN CLINIC | Facility: CLINIC | Age: 34
End: 2019-08-21
Payer: COMMERCIAL

## 2019-08-21 VITALS — WEIGHT: 145 LBS | SYSTOLIC BLOOD PRESSURE: 110 MMHG | BODY MASS INDEX: 27.4 KG/M2 | DIASTOLIC BLOOD PRESSURE: 70 MMHG

## 2019-08-21 DIAGNOSIS — Z3A.32 32 WEEKS GESTATION OF PREGNANCY: Primary | ICD-10-CM

## 2019-08-21 PROCEDURE — 99213 OFFICE O/P EST LOW 20 MIN: CPT | Performed by: OBSTETRICS & GYNECOLOGY

## 2019-08-21 RX ORDER — CETIRIZINE HYDROCHLORIDE 10 MG/1
10 TABLET ORAL DAILY
COMMUNITY
End: 2022-01-28 | Stop reason: ALTCHOICE

## 2019-08-21 NOTE — PROGRESS NOTES
Doing well   No contrax's, no bleeding , no leaking  Info on perineal massage given   Pt plans to breastfeed   Pre birth visit number given

## 2019-09-06 ENCOUNTER — ULTRASOUND (OUTPATIENT)
Dept: PERINATAL CARE | Facility: CLINIC | Age: 34
End: 2019-09-06
Payer: COMMERCIAL

## 2019-09-06 VITALS
WEIGHT: 154 LBS | HEIGHT: 61 IN | DIASTOLIC BLOOD PRESSURE: 74 MMHG | HEART RATE: 66 BPM | SYSTOLIC BLOOD PRESSURE: 114 MMHG | BODY MASS INDEX: 29.07 KG/M2

## 2019-09-06 DIAGNOSIS — O09.293 PREVIOUS PREGNANCY COMPLICATED BY INTRAUTERINE GROWTH RESTRICTION, ANTEPARTUM, THIRD TRIMESTER: Primary | ICD-10-CM

## 2019-09-06 DIAGNOSIS — Z3A.34 34 WEEKS GESTATION OF PREGNANCY: ICD-10-CM

## 2019-09-06 PROCEDURE — 76816 OB US FOLLOW-UP PER FETUS: CPT | Performed by: OBSTETRICS & GYNECOLOGY

## 2019-09-06 NOTE — PROGRESS NOTES
The patient was seen today for an ultrasound  Please see ultrasound report (located under Ob Procedures) for additional details  Thank you very much for allowing us to participate in the care of this very nice patient  Should you have any questions, please do not hesitate to contact me  Samuel Vargas MD [id number]  Attending Physician, [place]

## 2019-09-11 ENCOUNTER — ROUTINE PRENATAL (OUTPATIENT)
Dept: OBGYN CLINIC | Facility: CLINIC | Age: 34
End: 2019-09-11
Payer: COMMERCIAL

## 2019-09-11 VITALS — BODY MASS INDEX: 28.72 KG/M2 | WEIGHT: 152 LBS | DIASTOLIC BLOOD PRESSURE: 74 MMHG | SYSTOLIC BLOOD PRESSURE: 126 MMHG

## 2019-09-11 DIAGNOSIS — Z34.83 PRENATAL CARE, SUBSEQUENT PREGNANCY IN THIRD TRIMESTER: Primary | ICD-10-CM

## 2019-09-11 DIAGNOSIS — O09.293 PREVIOUS PREGNANCY COMPLICATED BY INTRAUTERINE GROWTH RESTRICTION, ANTEPARTUM, THIRD TRIMESTER: ICD-10-CM

## 2019-09-11 PROCEDURE — 99213 OFFICE O/P EST LOW 20 MIN: CPT | Performed by: PHYSICIAN ASSISTANT

## 2019-09-18 ENCOUNTER — ROUTINE PRENATAL (OUTPATIENT)
Dept: OBGYN CLINIC | Facility: CLINIC | Age: 34
End: 2019-09-18
Payer: COMMERCIAL

## 2019-09-18 VITALS — SYSTOLIC BLOOD PRESSURE: 100 MMHG | BODY MASS INDEX: 28.91 KG/M2 | WEIGHT: 153 LBS | DIASTOLIC BLOOD PRESSURE: 62 MMHG

## 2019-09-18 DIAGNOSIS — O09.293 PREVIOUS PREGNANCY COMPLICATED BY INTRAUTERINE GROWTH RESTRICTION, ANTEPARTUM, THIRD TRIMESTER: ICD-10-CM

## 2019-09-18 DIAGNOSIS — Z36.85 ANTENATAL SCREENING FOR STREPTOCOCCUS B: Primary | ICD-10-CM

## 2019-09-18 DIAGNOSIS — Z3A.36 36 WEEKS GESTATION OF PREGNANCY: ICD-10-CM

## 2019-09-18 DIAGNOSIS — Z36.89 ENCOUNTER FOR OTHER SPECIFIED ANTENATAL SCREENING: ICD-10-CM

## 2019-09-18 LAB — EXTERNAL GROUP B STREP ANTIGEN: NEGATIVE

## 2019-09-18 PROCEDURE — 99213 OFFICE O/P EST LOW 20 MIN: CPT | Performed by: OBSTETRICS & GYNECOLOGY

## 2019-09-18 NOTE — PROGRESS NOTES
No leaking, no bleeding  Perineal massage discussed, info given  Breastfeeding discussed  Pre birth visit discussed again  She has not made an appt yet  Tdap vaccination discussed  Patient instructed to get vaccine  GBS cultures done  Patient sees therapist on Tuesdays  Instructed to give therapist a call and make sooner appointment  Appears upset today, states she is emotional due to hormones  Patient left the office crying  Did not make next appt

## 2019-09-18 NOTE — PROGRESS NOTES
MAGALIS: 10/15/19  GBS due  The patient has swelling in her feet  No cramping or bleeding  The patient has a lot of pelvic pressure  The pressure can be so bad that she has trouble walking  The pressure began two weeks ago  No nausea or vomiting  The patient has mild headaches

## 2019-09-21 LAB — GP B STREP GENITAL QL CULT: NEGATIVE

## 2019-09-23 ENCOUNTER — ANESTHESIA (INPATIENT)
Dept: ANESTHESIOLOGY | Facility: HOSPITAL | Age: 34
DRG: 560 | End: 2019-09-23
Payer: COMMERCIAL

## 2019-09-23 ENCOUNTER — HOSPITAL ENCOUNTER (INPATIENT)
Facility: HOSPITAL | Age: 34
LOS: 2 days | Discharge: HOME | DRG: 560 | End: 2019-09-25
Attending: OBSTETRICS & GYNECOLOGY | Admitting: OBSTETRICS & GYNECOLOGY
Payer: COMMERCIAL

## 2019-09-23 ENCOUNTER — ANESTHESIA EVENT (INPATIENT)
Dept: ANESTHESIOLOGY | Facility: HOSPITAL | Age: 34
DRG: 560 | End: 2019-09-23
Payer: COMMERCIAL

## 2019-09-23 DIAGNOSIS — Z3A.36 36 WEEKS GESTATION OF PREGNANCY: Primary | ICD-10-CM

## 2019-09-23 LAB
ABO GROUP BLD: NORMAL
BASE EXCESS BLDCOA CALC-SCNC: -8.9 MMOL/L (ref 3–11)
BASE EXCESS BLDCOV CALC-SCNC: -7 MMOL/L (ref 1–9)
BLD GP AB SCN SERPL QL: NEGATIVE
ERYTHROCYTE [DISTWIDTH] IN BLOOD BY AUTOMATED COUNT: 14.5 % (ref 11.6–15.1)
HCO3 BLDCOA-SCNC: 17.2 MMOL/L (ref 17.3–27.3)
HCO3 BLDCOV-SCNC: 18.1 MMOL/L (ref 12.2–28.6)
HCT VFR BLD AUTO: 32 % (ref 34.8–46.1)
HGB BLD-MCNC: 10.1 G/DL (ref 11.5–15.4)
MCH RBC QN AUTO: 23.5 PG (ref 26.8–34.3)
MCHC RBC AUTO-ENTMCNC: 31.6 G/DL (ref 31.4–37.4)
MCV RBC AUTO: 75 FL (ref 82–98)
O2 CT VFR BLDCOA CALC: 12.1 ML/DL
OXYHGB MFR BLDCOA: 48.9 %
OXYHGB MFR BLDCOV: 62.1 %
PCO2 BLDCOA: 38.2 MM[HG] (ref 30–60)
PCO2 BLDCOV: 35.6 MM HG (ref 27–43)
PH BLDCOA: 7.27 [PH] (ref 7.23–7.43)
PH BLDCOV: 7.32 [PH] (ref 7.19–7.49)
PLATELET # BLD AUTO: 170 THOUSANDS/UL (ref 149–390)
PMV BLD AUTO: 11.7 FL (ref 8.9–12.7)
PO2 BLDCOA: 21.7 MM HG (ref 5–25)
PO2 BLDCOV: 26.6 MM HG (ref 15–45)
RBC # BLD AUTO: 4.29 MILLION/UL (ref 3.81–5.12)
RH BLD: POSITIVE
SAO2 % BLDCOV: 14.8 ML/DL
SPECIMEN EXPIRATION DATE: NORMAL
WBC # BLD AUTO: 8.67 THOUSAND/UL (ref 4.31–10.16)

## 2019-09-23 PROCEDURE — 86850 RBC ANTIBODY SCREEN: CPT | Performed by: OBSTETRICS & GYNECOLOGY

## 2019-09-23 PROCEDURE — 86900 BLOOD TYPING SEROLOGIC ABO: CPT | Performed by: OBSTETRICS & GYNECOLOGY

## 2019-09-23 PROCEDURE — 86901 BLOOD TYPING SEROLOGIC RH(D): CPT | Performed by: OBSTETRICS & GYNECOLOGY

## 2019-09-23 PROCEDURE — 4A1HXCZ MONITORING OF PRODUCTS OF CONCEPTION, CARDIAC RATE, EXTERNAL APPROACH: ICD-10-PCS | Performed by: OBSTETRICS & GYNECOLOGY

## 2019-09-23 PROCEDURE — 99213 OFFICE O/P EST LOW 20 MIN: CPT

## 2019-09-23 PROCEDURE — NC001 PR NO CHARGE: Performed by: OBSTETRICS & GYNECOLOGY

## 2019-09-23 PROCEDURE — 85027 COMPLETE CBC AUTOMATED: CPT | Performed by: OBSTETRICS & GYNECOLOGY

## 2019-09-23 PROCEDURE — 3E033VJ INTRODUCTION OF OTHER HORMONE INTO PERIPHERAL VEIN, PERCUTANEOUS APPROACH: ICD-10-PCS | Performed by: OBSTETRICS & GYNECOLOGY

## 2019-09-23 PROCEDURE — 82805 BLOOD GASES W/O2 SATURATION: CPT | Performed by: OBSTETRICS & GYNECOLOGY

## 2019-09-23 PROCEDURE — 86592 SYPHILIS TEST NON-TREP QUAL: CPT | Performed by: OBSTETRICS & GYNECOLOGY

## 2019-09-23 PROCEDURE — 59409 OBSTETRICAL CARE: CPT | Performed by: OBSTETRICS & GYNECOLOGY

## 2019-09-23 RX ORDER — DOCUSATE SODIUM 100 MG/1
100 CAPSULE, LIQUID FILLED ORAL 2 TIMES DAILY
Status: DISCONTINUED | OUTPATIENT
Start: 2019-09-24 | End: 2019-09-25 | Stop reason: HOSPADM

## 2019-09-23 RX ORDER — CALCIUM CARBONATE 200(500)MG
1000 TABLET,CHEWABLE ORAL DAILY PRN
Status: DISCONTINUED | OUTPATIENT
Start: 2019-09-23 | End: 2019-09-25 | Stop reason: HOSPADM

## 2019-09-23 RX ORDER — OXYTOCIN/RINGER'S LACTATE 30/500 ML
1-30 PLASTIC BAG, INJECTION (ML) INTRAVENOUS
Status: DISCONTINUED | OUTPATIENT
Start: 2019-09-23 | End: 2019-09-23

## 2019-09-23 RX ORDER — OXYCODONE HYDROCHLORIDE 5 MG/1
5 TABLET ORAL EVERY 4 HOURS PRN
Status: DISCONTINUED | OUTPATIENT
Start: 2019-09-23 | End: 2019-09-25 | Stop reason: HOSPADM

## 2019-09-23 RX ORDER — BUPIVACAINE HYDROCHLORIDE 2.5 MG/ML
INJECTION, SOLUTION INFILTRATION; PERINEURAL AS NEEDED
Status: DISCONTINUED | OUTPATIENT
Start: 2019-09-23 | End: 2019-09-24 | Stop reason: SURG

## 2019-09-23 RX ORDER — FENTANYL CITRATE 50 UG/ML
INJECTION, SOLUTION INTRAMUSCULAR; INTRAVENOUS
Status: COMPLETED
Start: 2019-09-23 | End: 2019-09-23

## 2019-09-23 RX ORDER — SODIUM CHLORIDE, SODIUM LACTATE, POTASSIUM CHLORIDE, CALCIUM CHLORIDE 600; 310; 30; 20 MG/100ML; MG/100ML; MG/100ML; MG/100ML
125 INJECTION, SOLUTION INTRAVENOUS CONTINUOUS
Status: DISCONTINUED | OUTPATIENT
Start: 2019-09-23 | End: 2019-09-23

## 2019-09-23 RX ORDER — ONDANSETRON 2 MG/ML
4 INJECTION INTRAMUSCULAR; INTRAVENOUS EVERY 8 HOURS PRN
Status: DISCONTINUED | OUTPATIENT
Start: 2019-09-23 | End: 2019-09-25 | Stop reason: HOSPADM

## 2019-09-23 RX ORDER — DIPHENHYDRAMINE HCL 25 MG
25 TABLET ORAL EVERY 6 HOURS PRN
Status: DISCONTINUED | OUTPATIENT
Start: 2019-09-23 | End: 2019-09-25 | Stop reason: HOSPADM

## 2019-09-23 RX ORDER — FENTANYL CITRATE 50 UG/ML
INJECTION, SOLUTION INTRAMUSCULAR; INTRAVENOUS AS NEEDED
Status: DISCONTINUED | OUTPATIENT
Start: 2019-09-23 | End: 2019-09-24 | Stop reason: SURG

## 2019-09-23 RX ORDER — ONDANSETRON 2 MG/ML
4 INJECTION INTRAMUSCULAR; INTRAVENOUS EVERY 6 HOURS PRN
Status: DISCONTINUED | OUTPATIENT
Start: 2019-09-23 | End: 2019-09-23

## 2019-09-23 RX ORDER — LIDOCAINE HYDROCHLORIDE AND EPINEPHRINE 15; 5 MG/ML; UG/ML
INJECTION, SOLUTION EPIDURAL
Status: COMPLETED | OUTPATIENT
Start: 2019-09-23 | End: 2019-09-23

## 2019-09-23 RX ORDER — FLUOXETINE 10 MG/1
30 CAPSULE ORAL DAILY
Status: DISCONTINUED | OUTPATIENT
Start: 2019-09-23 | End: 2019-09-25 | Stop reason: HOSPADM

## 2019-09-23 RX ORDER — DIAPER,BRIEF,INFANT-TODD,DISP
1 EACH MISCELLANEOUS AS NEEDED
Status: DISCONTINUED | OUTPATIENT
Start: 2019-09-23 | End: 2019-09-25 | Stop reason: HOSPADM

## 2019-09-23 RX ORDER — IBUPROFEN 600 MG/1
600 TABLET ORAL EVERY 6 HOURS PRN
Status: DISCONTINUED | OUTPATIENT
Start: 2019-09-23 | End: 2019-09-25 | Stop reason: HOSPADM

## 2019-09-23 RX ORDER — ACETAMINOPHEN 325 MG/1
650 TABLET ORAL EVERY 4 HOURS PRN
Status: DISCONTINUED | OUTPATIENT
Start: 2019-09-23 | End: 2019-09-25 | Stop reason: HOSPADM

## 2019-09-23 RX ADMIN — SODIUM CHLORIDE, SODIUM LACTATE, POTASSIUM CHLORIDE, AND CALCIUM CHLORIDE 125 ML/HR: .6; .31; .03; .02 INJECTION, SOLUTION INTRAVENOUS at 16:54

## 2019-09-23 RX ADMIN — FLUOXETINE 30 MG: 20 CAPSULE ORAL at 18:32

## 2019-09-23 RX ADMIN — FENTANYL CITRATE 50 MCG: 50 INJECTION, SOLUTION INTRAMUSCULAR; INTRAVENOUS at 21:53

## 2019-09-23 RX ADMIN — BUPIVACAINE HYDROCHLORIDE 4 ML: 2.5 INJECTION, SOLUTION INFILTRATION; PERINEURAL at 21:57

## 2019-09-23 RX ADMIN — IBUPROFEN 600 MG: 600 TABLET ORAL at 23:13

## 2019-09-23 RX ADMIN — Medication 2 MILLI-UNITS/MIN: at 18:13

## 2019-09-23 RX ADMIN — SODIUM CHLORIDE, SODIUM LACTATE, POTASSIUM CHLORIDE, AND CALCIUM CHLORIDE 125 ML/HR: .6; .31; .03; .02 INJECTION, SOLUTION INTRAVENOUS at 19:25

## 2019-09-23 RX ADMIN — LIDOCAINE HYDROCHLORIDE AND EPINEPHRINE 2 ML: 15; 5 INJECTION, SOLUTION EPIDURAL at 21:51

## 2019-09-23 RX ADMIN — BUPIVACAINE HYDROCHLORIDE 4 ML: 2.5 INJECTION, SOLUTION INFILTRATION; PERINEURAL at 21:53

## 2019-09-23 RX ADMIN — LIDOCAINE HYDROCHLORIDE AND EPINEPHRINE 3 ML: 15; 5 INJECTION, SOLUTION EPIDURAL at 21:49

## 2019-09-23 RX ADMIN — ROPIVACAINE HYDROCHLORIDE: 2 INJECTION, SOLUTION EPIDURAL; INFILTRATION at 22:00

## 2019-09-23 NOTE — H&P
[address] [facility]: [id number]  Unit/Bed#: LD Triage  Encounter: [id number]    Assessment/Plan      Assessment: 30 y o  [id number] at 36w6d admitted for  premature rupture of membranes  SVE: 2 /-2  FHT: Category I  Clinical EFW: 6; vertex  GBS status: negative   Pregnancy complicated by: depression on prozac    Plan:   · Admit  · CBC, RPR, Type & Screen  · Analgesia at maternal request  · Will start pitocin for augmentation/induction  · Antibiotics not indicated    Dr Aram Kern aware      Subjective     Chief Complaint: leaking fluid    HPI: Gerardo Riggs is a 30 y o  [id number] with an MAGALIS of 10/15/2019, by Last Menstrual Period at 36w6d who is being admitted for  premature rupture of membranes  She complains of uterine contractions, occurring irregularly, has moderate LOF, and reports no VB  She states she has felt good FM  She reports her water breaking at noon today      Pregnancy complications: depression, PPROM    Patient Active Problem List   Diagnosis    Environmental and seasonal allergies    Depression with anxiety    Mixed hyperlipidemia    Mass of leg, right    Hx of preeclampsia, prior pregnancy, currently pregnant    Previous pregnancy complicated by intrauterine growth restriction, antepartum, third trimester    Depression affecting pregnancy in second trimester, antepartum    34 weeks gestation of pregnancy    36 weeks gestation of pregnancy       Baby complications/comments: none    Review of Systems    OB History    Para Term  AB Living   3 2 1 1   2   SAB TAB Ectopic Multiple Live Births           2      # Outcome Date GA Lbr Hi/2nd Weight Sex Delivery Anes PTL Lv   3 Current            2 Term 09/24/15 38w0d  2637 g (5 lb 13 oz) F Vag-Spont None N SANDRITA   1  13   1559 g (3 lb 7 oz) F Vag-Spont EPI Y SANDRITA      Complications: Preeclampsia       Past Medical History:   Diagnosis Date    Preeclampsia     Varicella        Past Surgical History:   Procedure Laterality Date    WISDOM TOOTH EXTRACTION         Social History     Tobacco Use    Smoking status: Former Smoker     Last attempt to quit: 2019     Years since quittin 7    Smokeless tobacco: Never Used   Substance Use Topics    Alcohol use: No       Allergies   Allergen Reactions    Eggs Or Egg-Derived Products      Egg whites       Medications Prior to Admission   Medication    Acetaminophen (TYLENOL PO)    ALPRAZolam (XANAX) 1 mg tablet    cetirizine (ZyrTEC) 10 mg tablet    FLUoxetine (PROzac) 10 mg capsule    FLUoxetine (PROzac) 20 mg capsule    Prenatal MV-Min-FA-Omega-3 (PRENATAL GUMMIES/DHA & FA PO)    folic acid (FOLVITE) 1 mg tablet         Objective     Vitals:  Temp:  [97 4 °F (36 3 °C)] 97 4 °F (36 3 °C)  Resp:  [16] 16  Body mass index is 28 91 kg/m²  Physical Exam:  Physical Exam   Constitutional: She is oriented to person, place, and time  She appears well-developed and well-nourished  Cardiovascular: Normal rate  Pulmonary/Chest: Effort normal  No respiratory distress  Abdominal:   Gravid, soft, non-tender   Musculoskeletal: She exhibits no edema  Neurological: She is alert and oriented to person, place, and time  Skin: Skin is warm and dry  Psychiatric: She has a normal mood and affect   Her behavior is normal         SVE:  Dilation: 2-3  Effacement (%): 50  Station: -2    FHT:  Baseline Rate: 130 bpm  FHR Category: Category I    TOCO:   Contraction Frequency (minutes): irregular  Contraction Duration (seconds): 80  Contraction Quality: Mild    Lab Results   Component Value Date    WBC 8 33 2019    HGB 10 5 (L) 2019    HCT 33 8 (L) 2019     2019     Lab Results   Component Value Date    K 4 0 2018     2018    CO2 26 2018    BUN 13 2018    CREATININE 0 73 2018    AST 17 2018    ALT 16 2018       Prenatal Labs: Reviewed      Blood type: O+  Antibody: negative  Group B strep: negative  HIV: negative  Hepatitis B: negative  RPR: non-reactive  Rubella: Immune  Varicella: Immune  1 hour Glucose: 89      >2 Midnights  INPATIENT       Nicole Crespo MD  OB/GYN PGY-2  9/23/2019  4:31 PM

## 2019-09-24 LAB — RPR SER QL: NORMAL

## 2019-09-24 PROCEDURE — 88307 TISSUE EXAM BY PATHOLOGIST: CPT | Performed by: PATHOLOGY

## 2019-09-24 PROCEDURE — 87081 CULTURE SCREEN ONLY: CPT | Performed by: STUDENT IN AN ORGANIZED HEALTH CARE EDUCATION/TRAINING PROGRAM

## 2019-09-24 PROCEDURE — 99024 POSTOP FOLLOW-UP VISIT: CPT | Performed by: OBSTETRICS & GYNECOLOGY

## 2019-09-24 RX ADMIN — IBUPROFEN 600 MG: 600 TABLET ORAL at 07:55

## 2019-09-24 RX ADMIN — IBUPROFEN 600 MG: 600 TABLET ORAL at 14:13

## 2019-09-24 RX ADMIN — FLUOXETINE 30 MG: 20 CAPSULE ORAL at 08:20

## 2019-09-24 RX ADMIN — DOCUSATE SODIUM 100 MG: 100 CAPSULE, LIQUID FILLED ORAL at 08:20

## 2019-09-24 RX ADMIN — OXYCODONE HYDROCHLORIDE 5 MG: 5 TABLET ORAL at 11:40

## 2019-09-24 RX ADMIN — OXYCODONE HYDROCHLORIDE 5 MG: 5 TABLET ORAL at 16:56

## 2019-09-24 RX ADMIN — DOCUSATE SODIUM 100 MG: 100 CAPSULE, LIQUID FILLED ORAL at 16:56

## 2019-09-24 RX ADMIN — BENZOCAINE AND LEVOMENTHOL: 200; 5 SPRAY TOPICAL at 20:30

## 2019-09-24 RX ADMIN — IBUPROFEN 600 MG: 600 TABLET ORAL at 20:48

## 2019-09-24 RX ADMIN — OXYCODONE HYDROCHLORIDE 5 MG: 5 TABLET ORAL at 23:56

## 2019-09-24 RX ADMIN — OXYCODONE HYDROCHLORIDE 5 MG: 5 TABLET ORAL at 04:35

## 2019-09-24 NOTE — LACTATION NOTE
CONSULT - LACTATION  Denita Amador 30 y o  female MRN: [id number]    [facility] [address] Room / Bed: [place] [address]/[place] [address] Encounter: [id number]    Maternal Information     MOTHER:  N/A  Maternal Age: This patient's mother is not on file  OB History: This patient's mother is not on file  Previouse breast reduction surgery? No    Lactation history:   Has patient previously breast fed: Yes   How long had patient previously breast fed: 3 years for first child (first three months pumping for NICU), 2nd child breast fed for 2 years   Previous breast feeding complications: None   This patient's mother is not on file  Birth information:  YOB: 1985   Time of birth:     Sex: female   Delivery type:     Birth Weight: No birth weight on file  Percent of Weight Change: Birth weight not on file     Gestational Age: <None>   [unfilled]    Assessment     Breast and nipple assessment: not assessed at this time     Assessment: infant in NICU    Feeding assessment: Infant in NICU  LATCH:  Latch: Audible Swallowing:     Type of Nipple:     Comfort (Breast/Nipple):     Hold (Positioning):     LATCH Score:            Feeding recommendations:  pump every 2-3 hours     Instructions given on pumping  Discussed when to start, frequency, different pumps available versus manual expression  Discussed hygiene of hands and supplies as well as assembly, placement of flanges, size of flanged, preparing the breast and cycles and suction settings on pump  Demonstrated use of hand pump  Discussed labeling of milk, storage, and preparation of stored milk  Zachariah Tsai was not interested in a pumping log at the moment saying that she is feeling overwhelmed and is pumping every 3 hours  Encouraged Zachariah Tsai to call if she has any additional concerns  Contact information provided on white board in room        Marcela Gutierres RN 2019 3:10 PM

## 2019-09-24 NOTE — ANESTHESIA PREPROCEDURE EVALUATION
29 yo  at 36+6 with PROM, requesting labor analgesia  Review of Systems/Medical History  Patient summary reviewed  Chart reviewed  No history of anesthetic complications     Cardiovascular  Negative cardio ROS    Pulmonary  Negative pulmonary ROS        GI/Hepatic  Negative GI/hepatic ROS          Negative  ROS        Endo/Other  Negative endo/other ROS      GYN  Currently pregnant , Prior pregnancy/OB history ,          Hematology  Anemia ,     Musculoskeletal  Negative musculoskeletal ROS        Neurology  Negative neurology ROS      Psychology   Anxiety, Depression ,              Physical Exam    Airway       Dental       Cardiovascular  Comment: Negative ROS,     Pulmonary      Other Findings      Lab Results   Component Value Date    HGB 10 1 (L) 2019     2019         Anesthesia Plan  ASA Score- 2     Anesthesia Type- spinal and epidural with ASA Monitors  Additional Monitors:   Airway Plan:         Plan Factors-    Induction-     Postoperative Plan-     Informed Consent- Anesthetic plan and risks discussed with patient and spouse

## 2019-09-24 NOTE — SOCIAL WORK
Breast pump consult  Discussed freedom of choice and options with pt  Per pt request, Ameda pump ordered from [facility] via [facility] for d/c tomorrow  Pt reports this is her first pump order with [facility]  CM reviewed d/c planning process including the following: identifying help at home, patient preference for d/c planning needs, Discharge Lounge, [facility] Meds to Bed program, availability of treatment team to discuss questions or concerns patient and/or family may have regarding understanding medications and recognizing signs and symptoms once discharged  CM also encouraged patient to follow up with all recommended appointments after discharge  Patient advised of importance for patient and family to participate in managing patients medical well being  No other CM needs noted

## 2019-09-24 NOTE — ANESTHESIA PROCEDURE NOTES
Epidural Block    Patient location during procedure: OB  Start time: 9/23/2019 9:49 PM  Reason for block: procedure for pain and at surgeon's request  Staffing  Anesthesiologist: Ilia Lee MD  Performed: anesthesiologist   Preanesthetic Checklist  Completed: patient identified, site marked, surgical consent, pre-op evaluation, timeout performed, IV checked, risks and benefits discussed and monitors and equipment checked  Epidural  Patient position: sitting  Prep: Betadine and site prepped and draped  Patient monitoring: heart rate, continuous pulse ox and frequent blood pressure checks  Approach: midline  Location: lumbar (1-5) (L3/4)  Injection technique: ANGELINE saline  Needle  Needle type: Tuohy   Epidural needle gauge: 17 g  Catheter type: side hole  Catheter size: 19 g springwound  Catheter at skin depth: 11 cm  Test dose: negativelidocaine 1 5% with epinephrine 1:200,000 test dose, 3 mLnegative aspiration for CSF, negative aspiration for heme and no paresthesia on injection  patient tolerated the procedure well with no immediate complications  Additional Notes  Pt positioned sitting, timeout, sterile prep and drape  Placement x 1 attempt at L3/4 with ANGELINE to NS  Attempted CSE but no CSF flow - cath threaded easily, negative aspiration and test, catheter dosed incrementally  Patient laid supine with MONALISA, PCEA initiated, + relief

## 2019-09-24 NOTE — PLAN OF CARE
Problem: Knowledge Deficit  Goal: Verbalizes understanding of labor plan  Description  Assess patient/family/caregiver's baseline knowledge level and ability to understand information  Provide education via patient/family/caregiver's preferred learning method at appropriate level of understanding  1  Provide teaching at level of understanding  2  Provide teaching via preferred learning method(s)  Outcome: Progressing  Goal: Patient/family/caregiver demonstrates understanding of disease process, treatment plan, medications, and discharge instructions  Description  Complete learning assessment and assess knowledge base    Interventions:  - Provide teaching at level of understanding  - Provide teaching via preferred learning methods  Outcome: Progressing     Problem: POSTPARTUM  Goal: Experiences normal postpartum course  Description  INTERVENTIONS:  - Monitor maternal vital signs  - Assess uterine involution and lochia  Outcome: Progressing  Goal: Appropriate maternal -  bonding  Description  INTERVENTIONS:  - Identify family support  - Assess for appropriate maternal/infant bonding   -Encourage maternal/infant bonding opportunities  - Referral to  or  as needed  Outcome: Progressing  Goal: Establishment of infant feeding pattern  Description  INTERVENTIONS:  - Assess breast/bottle feeding  - Refer to lactation as needed  Outcome: Progressing  Goal: Incision(s), wounds(s) or drain site(s) healing without S/S of infection  Description  INTERVENTIONS  - Assess and document risk factors for skin impairment   - Assess and document dressing, incision, wound bed, drain sites and surrounding tissue  - Consider nutrition services referral as needed  - Oral mucous membranes remain intact  - Provide patient/ family education  Outcome: Progressing     Problem: ALTERATION IN THE BREAST  Goal: Optimize infant feeding at the breast  Description  INTERVENTIONS:  - Latch, breast and nipple assessment  - Assess prior breast feeding history  - Hand expression of breast milk  - For cracked, bleeding and or sore nipples reassess latch, treat damaged nipple  -Educate mother on feeding cues  -Positioning/latch techniques  Outcome: Progressing     Problem: INADEQUATE LATCH, SUCK OR SWALLOW  Goal: Demonstrate ability to latch and sustain latch, audible swallowing and satiety  Description  INTERVENTIONS:  - Assess oral anatomy, notify Physician/AP for abnormal findings  - Establish milk expression  - Maximize feeding opportunity (skin to skin, behavioral state)  - Position/latch techniques  - Discourage use of pacifier-artificial nipple  - Mechanical pumping  - Nipple Shield  - Supplemental formula feeding (Physician/AP order)  - Alternative feeding method  Outcome: Progressing     Problem: PAIN - ADULT  Goal: Verbalizes/displays adequate comfort level or baseline comfort level  Description  Interventions:  - Encourage patient to monitor pain and request assistance  - Assess pain using appropriate pain scale  - Administer analgesics based on type and severity of pain and evaluate response  - Implement non-pharmacological measures as appropriate and evaluate response  - Consider cultural and social influences on pain and pain management  - Notify physician/advanced practitioner if interventions unsuccessful or patient reports new pain  Outcome: Progressing     Problem: INFECTION - ADULT  Goal: Absence or prevention of progression during hospitalization  Description  INTERVENTIONS:  - Assess and monitor for signs and symptoms of infection  - Monitor lab/diagnostic results  - Monitor all insertion sites, i e  indwelling lines, tubes, and drains  - Monitor endotracheal if appropriate and nasal secretions for changes in amount and color  - [place] appropriate cooling/warming therapies per order  - Administer medications as ordered  - Instruct and encourage patient and family to use good hand hygiene technique  - Identify and instruct in appropriate isolation precautions for identified infection/condition  Outcome: Progressing  Goal: Absence of fever/infection during neutropenic period  Description  INTERVENTIONS:  - Monitor WBC    Outcome: Progressing     Problem: SAFETY ADULT  Goal: Patient will remain free of falls  Description  INTERVENTIONS:  - Assess patient frequently for physical needs  -  Identify cognitive and physical deficits and behaviors that affect risk of falls    -  [place] fall precautions as indicated by assessment   - Educate patient/family on patient safety including physical limitations  - Instruct patient to call for assistance with activity based on assessment  - Modify environment to reduce risk of injury  - Consider OT/PT consult to assist with strengthening/mobility  Outcome: Progressing  Goal: Maintain or return to baseline ADL function  Description  INTERVENTIONS:  -  Assess patient's ability to carry out ADLs; assess patient's baseline for ADL function and identify physical deficits which impact ability to perform ADLs (bathing, care of mouth/teeth, toileting, grooming, dressing, etc )  - Assess/evaluate cause of self-care deficits   - Assess range of motion  - Assess patient's mobility; develop plan if impaired  - Assess patient's need for assistive devices and provide as appropriate  - Encourage maximum independence but intervene and supervise when necessary  - Involve family in performance of ADLs  - Assess for home care needs following discharge   - Consider OT consult to assist with ADL evaluation and planning for discharge  - Provide patient education as appropriate  Outcome: Progressing  Goal: Maintain or return mobility status to optimal level  Description  INTERVENTIONS:  - Assess patient's baseline mobility status (ambulation, transfers, stairs, etc )    - Identify cognitive and physical deficits and behaviors that affect mobility  - Identify mobility aids required to assist with transfers and/or ambulation (gait belt, sit-to-stand, lift, walker, cane, etc )  - [place] fall precautions as indicated by assessment  - Record patient progress and toleration of activity level on Mobility SBAR; progress patient to next Phase/Stage  - Instruct patient to call for assistance with activity based on assessment  - Consider rehabilitation consult to assist with strengthening/weightbearing, etc   Outcome: Progressing     Problem: DISCHARGE PLANNING  Goal: Discharge to home or other facility with appropriate resources  Description  INTERVENTIONS:  - Identify barriers to discharge w/patient and caregiver  - Arrange for needed discharge resources and transportation as appropriate  - Identify discharge learning needs (meds, wound care, etc )  - Arrange for interpretive services to assist at discharge as needed  - Refer to Case Management Department for coordinating discharge planning if the patient needs post-hospital services based on physician/advanced practitioner order or complex needs related to functional status, cognitive ability, or social support system  Outcome: Progressing

## 2019-09-24 NOTE — PLAN OF CARE
Problem: Knowledge Deficit  Goal: Verbalizes understanding of labor plan  Description  Assess patient/family/caregiver's baseline knowledge level and ability to understand information  Provide education via patient/family/caregiver's preferred learning method at appropriate level of understanding  1  Provide teaching at level of understanding  2  Provide teaching via preferred learning method(s)    Outcome: Progressing     Problem: POSTPARTUM  Goal: Experiences normal postpartum course  Description  INTERVENTIONS:  - Monitor maternal vital signs  - Assess uterine involution and lochia  Outcome: Progressing  Goal: Appropriate maternal -  bonding  Description  INTERVENTIONS:  - Identify family support  - Assess for appropriate maternal/infant bonding   -Encourage maternal/infant bonding opportunities  - Referral to  or  as needed  Outcome: Progressing  Goal: Establishment of infant feeding pattern  Description  INTERVENTIONS:  - Assess breast/bottle feeding  - Refer to lactation as needed  Outcome: Progressing  Goal: Incision(s), wounds(s) or drain site(s) healing without S/S of infection  Description  INTERVENTIONS  - Assess and document risk factors for skin impairment   - Assess and document dressing, incision, wound bed, drain sites and surrounding tissue  - Consider nutrition services referral as needed  - Oral mucous membranes remain intact  - Provide patient/ family education  Outcome: Progressing     Problem: ALTERATION IN THE BREAST  Goal: Optimize infant feeding at the breast  Description  INTERVENTIONS:  - Latch, breast and nipple assessment  - Assess prior breast feeding history  - Hand expression of breast milk  - For cracked, bleeding and or sore nipples reassess latch, treat damaged nipple  -Educate mother on feeding cues  -Positioning/latch techniques  Outcome: Progressing     Problem: INADEQUATE LATCH, SUCK OR SWALLOW  Goal: Demonstrate ability to latch and sustain latch, audible swallowing and satiety  Description  INTERVENTIONS:  - Assess oral anatomy, notify Physician/AP for abnormal findings  - Establish milk expression  - Maximize feeding opportunity (skin to skin, behavioral state)  - Position/latch techniques  - Discourage use of pacifier-artificial nipple  - Mechanical pumping  - Nipple Shield  - Supplemental formula feeding (Physician/AP order)  - Alternative feeding method  Outcome: Progressing

## 2019-09-24 NOTE — OB LABOR/OXYTOCIN SAFETY PROGRESS
Oxytocin Safety Progress Check Note - Wyvonne Canavan 30 y o  female MRN: [id number]    Unit/Bed#: -01 Encounter: [id number]    Dose (sadia-units/min) Oxytocin: 14 sadia-units/min  Contraction Frequency (minutes): 2 5-3 5  Contraction Quality: Strong  Tachysystole: No   Dilation: 4        Effacement (%): 70  Station: -2  Baseline Rate: 125 bpm  Fetal Heart Rate: 133 BPM  FHR Category: Category I             Notes/comments:   Patient is feeling very uncomfortable, painful contractions  Would like an epidural  FHT Category I   Will continue pitocin titration and reassess    Andrey Dang MD 9/23/2019 9:28 PM

## 2019-09-24 NOTE — PROGRESS NOTES
Progress Note - OB/GYN   Sylvia Prudent 30 y o  female MRN: [id number]  Unit/Bed#: -01 Encounter: [id number]    Assessment:  Post partum Day #1 s/p , stable, baby in room with parents    Plan:  1  Post partum   - Continue routine post partum care  - Encourage ambulation  - Encourage breastfeeding    2  Depression  - Continue home prozac    3  Chart history of MRSA  - Follow up repeat swab    4  Discharge planning  - Vaccines: TDAP ordered  - Anticipate discharge tomorrow        Subjective/Objective   Chief Complaint:     Post delivery  Patient is doing well  Lochia WNL  Pain well controlled  Subjective: This morning, she has no complaints  Pain: yes, some cramping, improved with meds  Tolerating PO: yes  Voiding: yes  Flatus: yes  BM: no  Ambulating: yes  Breastfeeding:  yes  Chest pain: no  Shortness of breath: no  Leg pain: no  Lochia: minimal    Objective:     Vitals: BP 99/54 (BP Location: Right arm)   Pulse (!) 53 Comment: Nurse notified   Temp 97 8 °F (36 6 °C) (Oral)   Resp 16   Ht 5' 1" (1 549 m)   Wt 69 4 kg (153 lb)   LMP 2019   Breastfeeding?  Yes   BMI 28 91 kg/m²       Intake/Output Summary (Last 24 hours) at 2019 6163  Last data filed at 2019 0501  Gross per 24 hour   Intake 2325 46 ml   Output 1880 ml   Net 445 46 ml       Lab Results   Component Value Date    WBC 8 67 2019    HGB 10 1 (L) 2019    HCT 32 0 (L) 2019    MCV 75 (L) 2019     2019       Physical Exam:   Physical Exam  NAD  Breathing comfortably on room air  Abdomen soft, nontender, nondistended  Uterine fundus firm, nontender, at the umbilicus  WWP, intact distal pulses      Heaven Dubon MD  2019  6:08 AM

## 2019-09-24 NOTE — ANESTHESIA POSTPROCEDURE EVALUATION
Post-Op Assessment Note    CV Status:  Stable    Pain management: adequate     Mental Status:  Alert and awake   Hydration Status:  Euvolemic   PONV Controlled:  Controlled   Airway Patency:  Patent   Post Op Vitals Reviewed: Yes      Staff: Anesthesiologist     Post-op block assessment: catheter intact and no complications      Vitals:    09/24/19 0020   BP: 125/72   Pulse: 55   Resp:    Temp:

## 2019-09-24 NOTE — L&D DELIVERY NOTE
Vaginal Delivery Summary - OB/GYN   Tg Landa 30 y o  female MRN: [id number]  Unit/Bed#: [facility] 874-01 Encounter: [id number]          Predelivery Diagnosis:  1  Pregnancy at 36w6d   2  Depression and anxiety     Postdelivery Diagnosis:  1  Same as above  2  Delivery of term     Procedure: Spontaneous Vaginal     Attending: Chun Gomez    Assistant: Romina Perez    Anesthesia: Epidural    QBL: 155cc  Admission Hg: 10 1  Admission platelets: 214    Complications: none apparent    Specimens: cord blood, arterial and venous cord blood gasses, placenta to pathology    Findings:   1  Viable male at 2239, with APGARS of 8 and 9 at 1 and 5 minutes respectively,  2  Spontaneous delivery of intact placenta at 2243  3  Blood gases:    Umbilical Cord Venous Blood Gas:  Results from last 7 days   Lab Units 19  2244   PH COV  7 324   PCO2 COV mm HG 35 6   HCO3 COV mmol/L 18 1   BASE EXC COV mmol/L -7 0*   O2 CT CD VB mL/dL 14 8   O2 HGB, VENOUS CORD % 47 6     Umbilical Cord Arterial Blood Gas:  Results from last 7 days   Lab Units 19  2244   PH COA  7 271   PCO2 COA  38 2   PO2 COA mm HG 21 7   HCO3 COA mmol/L 17 2*   BASE EXC COA mmol/L -8 9*   O2 CONTENT CORD ART ml/dl 12 1   O2 HGB, ARTERIAL CORD % 48 9       Disposition:  Patient tolerated the procedure well and was recovering in labor and delivery room     Brief history and labor course:  Ms Tg Landa is a 30 y o  [id number] at 36wk6d  She presented to labor and delivery with , premature rupture of membranes  She was started on a pitocin titration and received an epidural for pain management  She progressed to complete cervical dilation  Prior to being complete, patient's FHT showed a prolonged deceleration  She was repositioned to all fours, given oxygen, a fluid bolus, and pitocin was stopped  The baseline recovered and patient was placed on her back to begin pushing       Description of procedure    After pushing for 18 minutes, at 2239 patient delivered a viable male , wt pending, apgars of 8 (1 min) and 9 (5 min)  The fetal vertex delivered spontaneously  Baby was checked for nuchal, which was not present  The anterior shoulder delivered atraumatically with maternal expulsive forces and the assistance of downward traction  The posterior shoulder delivered with maternal expulsive forces and the assistance of upward traction  The remainder of the fetus delivered spontaneously  Upon delivery, the infant was placed on the mothers abdomen and the cord was clamped and cut  Delayed cord clamping was performed  The infant was noted to cry spontaneously and was moving all extremities appropriately  There was no evidence for injury  Awaiting nurse resuscitators evaluated the   Arterial and venous cord blood gases and cord blood was collected for analysis  These were promptly sent to the lab  In the immediate post-partum, 30 units of IV pitocin was administered, and the uterus was noted to contract down well with massage and pitocin  The placenta delivered spontaneously at 2243 and was noted to have a centrally inserted 3 vessel cord  The vagina, cervix, perineum, and rectum were inspected and there were no lacerations present  At the conclusion of the procedure, all needle, sponge, and instrument counts were noted to be correct  Patient tolerated the procedure well and was allowed to recover in labor and delivery room with family and  before being transferred to the post-partum floor  Dr Thien Gallego was present and participated in all key portions of the case

## 2019-09-24 NOTE — DISCHARGE INSTRUCTIONS
Vaginal Delivery   WHAT YOU NEED TO KNOW:   A vaginal delivery occurs when your baby is born through your vagina (birth canal)  DISCHARGE INSTRUCTIONS:   Seek care immediately if:   · Your leg feels warm, tender, and painful  It may look swollen and red  · You have a fever  · You are urinating very little, or not at all  · You have heavy vaginal bleeding that fills 1 or more sanitary pads in 1 hour  · You feel weak, dizzy, or faint  Contact your healthcare provider if:   · Your abdominal or perineal pain does not go away, or gets worse  · You feel depressed  · You have questions or concerns about your condition or care  Medicines:  · NSAIDs , such as ibuprofen, help decrease swelling, pain, and fever  This medicine is available with or without a doctor's order  NSAIDs can cause stomach bleeding or kidney problems in certain people  If you take blood thinner medicine, always ask your healthcare provider if NSAIDs are safe for you  Always read the medicine label and follow directions  · Stool softeners  make it easier for you to have a bowel movement  You may need this medicine to treat or prevent constipation  · Take your medicine as directed  Contact your healthcare provider if you think your medicine is not helping or if you have side effects  Tell him or her if you are allergic to any medicine  Keep a list of the medicines, vitamins, and herbs you take  Include the amounts, and when and why you take them  Bring the list or the pill bottles to follow-up visits  Carry your medicine list with you in case of an emergency  Follow up with your healthcare provider:  Most women need to return 6 weeks after a vaginal delivery  Ask your healthcare provider how to care for your wounds or stitches, if you have them  Write down your questions so you remember to ask them during your visits  Activity:  Rest as much as possible  Try to keep all activities short   You may be able to do some exercise soon after you have your baby  Talk with your healthcare provider before you start exercising  If you work outside the home, ask when you can return to your job  Kegel exercises:  Kegel exercises may help your vaginal and rectal muscles heal faster  You can do Kegel exercises by tightening and relaxing the muscles around your vagina  Kegel exercises help make the muscles stronger  Breast care:  When your milk comes in, your breasts may feel full and hard  Ask how to care for your breasts, even if you are not breastfeeding  Constipation:  You may have constipation for a period of time after you have your baby  Do not try to push the bowel movement out if it is too hard  High-fiber foods and extra liquids can help you prevent constipation  Examples of high-fiber foods are fruit and bran  Prune juice and water are good liquids to drink  You may also be told to take over-the-counter fiber and stool softener medicines  Take these items as directed  Ask how to prevent or treat hemorrhoids  Perineum care: Your perineum is the area between your vagina and anus  Keep the area clean and dry  This will help it heal and prevent infection  Wash the area gently with soap and water when you bathe or shower  Rinse your perineum with warm water after you urinate or have a bowel movement  Your healthcare provider may suggest you use a warm sitz bath to help decrease pain  To take a sitz bath, fill a bathtub with 4 to 6 inches of warm water  You may also use a sitz bath pan that fits inside the toilet  Sit in the sitz bath for 20 minutes  Do this 2 to 3 times a day, or as directed  The warm water can help decrease pain and swelling  Vaginal discharge: You will have vaginal discharge, called lochia, after your delivery  The lochia is red or dark brown with clots for 1 to 3 days after the birth  The amount will decrease and turn pale pink or brown for 3 to 10 days   It will turn white or yellow on the 10th or 14th day  Lochia is usually gone within 3 weeks  Use a sanitary pad rather than a tampon to prevent a vaginal infection  You will have lochia for up to 3 weeks after your baby is born  Monthly periods: Your period may start again within 7 to 9 weeks after your baby is born  If you are breastfeeding, it may take longer for your period to start again  You can still get pregnant again even though you do not have your monthly period  Talk with your healthcare provider about a birth control method if you do not want to get pregnant  Mood changes: Many new mothers have some kind of mood changes after delivery  Some of these changes occur because of lack of sleep, hormone changes, and caring for a new baby  Some mood changes can be more serious, such as postpartum depression  Talk with your healthcare provider if you feel unable to care for yourself or your baby  Sexual activity:  Do not have sex until your healthcare provider says it is okay  You may notice you have a decreased desire for sex, or sex may be painful  You may need to use a vaginal lubricant (gel) to help make sex more comfortable  © 2017 [address] [facility] Information is for End User's use only and may not be sold, redistributed or otherwise used for commercial purposes  All illustrations and images included in CareNotes® are the copyrighted property of A D A M , Inc  or [employer]  The above information is an  only  It is not intended as medical advice for individual conditions or treatments  Talk to your doctor, nurse or pharmacist before following any medical regimen to see if it is safe and effective for you  Postpartum Depression   WHAT YOU NEED TO KNOW:   What is postpartum depression? Postpartum depression is a mood disorder that occurs after giving birth  A mood is an emotion or a feeling   Moods affect your behavior and how you feel about yourself and life in general  Depression is a sad mood that you cannot control  Women often feel sad, afraid, or nervous after their baby is born  These feelings are called postpartum blues or baby blues, and they usually go away in 1 to 2 weeks  With postpartum depression, these symptoms get worse and continue for more than 2 weeks  Postpartum depression is a serious condition that affects your daily activities and relationships  What causes postpartum depression? Healthcare providers do not know exactly what causes postpartum depression  It may be caused by a sudden drop in hormone levels after childbirth  A previous episode of postpartum depression or a family history of depression may increase your risk  Several things may trigger postpartum depression:  · Lack of support from the baby's father or other family members    · Feeling more tired than usual    · Stress, a poor diet, or lack of sleep    · Pain after childbirth or pain during breastfeeding    · Sudden change in lifestyle  How is postpartum depression diagnosed? Postpartum depression affects your daily activities and your relationships with other people  Healthcare providers will ask you questions about your signs and symptoms and how they are affecting your life  The symptoms of postpartum depression usually begin within 1 month after childbirth  You feel depressed or lose interest in activities you enjoy nearly every day for at least 2 weeks  You also have 4 or more of the following symptoms:  · You feel tired or have less energy than usual      · You feel unimportant or guilty most of the time  · You think about hurting or killing yourself  · Your appetite changes  You may lose your appetite and lose weight without trying  Your appetite may also increase and you may gain weight  · You are restless, irritable, or withdrawn  · You have trouble concentrating and remembering things  You have trouble doing daily tasks or making decisions      · You have trouble sleeping, even after the baby is asleep  How is postpartum depression treated? · Psychotherapy:  During therapy, you will talk with healthcare providers about how to cope with your feelings and moods  This can be done alone or in a group  It may also be done with family members or your partner  · Antidepressants: This medicine is given to decrease or stop the symptoms of depression  You usually need to take antidepressants for several weeks before you begin to feel better  Do not stop taking antidepressants unless your healthcare provider tells you to  Healthcare providers may try a different antidepressant if one type does not work  What can I do to feel better? · Rest:  Do not try to do everything all at the same time  Do only what is needed and let other things wait until later  Ask your family or friends for help, especially if you have other children  Ask your partner to help with night feedings or other baby care  Try to sleep when the baby naps  · Get emotional support:  Share your feelings with your partner, a friend, or another mother  · Take care of yourself:  Shower and dress each day  Do not skip meals  Try to get out of the house a little each day  Get regular exercise  Eat a healthy diet  Avoid alcohol because it can make your depression worse  Do not isolate yourself  Go for a walk or meet with a friend  It is also important that you have some time by yourself each day  How do I find support and more information? · [address] [facility], Public Information & Communication Branch  [address], [address], [address]  [place] [address]   Phone: [phone]  Phone: [phone]  Web Address: [facility]  When should I contact my healthcare provider? · You cannot make it to your next visit  · Your depression does not get better with treatment or it gets worse  · You have questions or concerns about your condition or care    When should I seek immediate care or call 911? · You think about hurting or killing yourself, your baby, or someone else  · You feel like other people want to hurt you  · You hear voices telling you to hurt yourself or your baby  CARE AGREEMENT:   You have the right to help plan your care  Learn about your health condition and how it may be treated  Discuss treatment options with your caregivers to decide what care you want to receive  You always have the right to refuse treatment  The above information is an  only  It is not intended as medical advice for individual conditions or treatments  Talk to your doctor, nurse or pharmacist before following any medical regimen to see if it is safe and effective for you  © 2017 [address] [employer]  Information is for End User's use only and may not be sold, redistributed or otherwise used for commercial purposes  All illustrations and images included in CareNotes® are the copyrighted property of A D A M , Inc  or [employer]  Self Care After Delivery   AMBULATORY CARE:   The postpartum period  is the period of time from delivery to about 6 weeks  During this time you may experience many physical and emotional changes  It is important to understand what is normal and when you need to call your healthcare provider  It is also important to know how to care for yourself during this time  Call 911 for any of the following:   · You soak through 1 pad in 15 minutes, have blurry vision, clammy or pale skin, and feel faint  · You faint or lose consciousness  · Your heart is beating faster than normal      · You have trouble breathing  · You cough up blood  Seek care immediately if:   · You soak through 1 or more pads in an hour, or pass blood clots larger than a quarter from your vagina  · Your leg is painful, red, and larger than normal      · You have severe abdominal pain  · You have a bad headache or changes in your vision  · Your episiotomy or C section incision is red, swollen, bleeding, or draining pus  · Your incision comes apart  · You see or hear things that are not there, or have thoughts of harming yourself or your baby  Contact your obstetrician or midwife if:   · You have a fever  · You have new or worsening pain in your abdomen or vagina  · You continue to have the baby blues 10 days after you deliver  · You have trouble sleeping  · You have foul-smelling discharge from your vagina  · You have pain or burning when you urinate  · You do not have a bowel movement for 3 days or more  · You have nausea or are vomiting  · You have hard lumps or red streaks over your breasts  · You have cracked nipples or bleed from your nipples  · You have questions or concerns about your condition or care  Physical changes: The following are normal changes after you give birth:  · Pain in the area between your anus and vagina    · Breast pain    · Constipation or hemorrhoids    · Hot or cold flashes    · Vaginal bleeding or discharge    · Mild to moderate abdominal cramping    · Difficulty controlling bowel movements or urine  Emotional changes: The following are symptoms of the baby blues, or normal emotions after you give birth  The changes in your emotions may be caused by a drop in hormone levels after you deliver  If these symptoms last longer than 1 to 2 weeks after you give birth, you may have postpartum depression  · Feeling irritable    · Feeling sad    · Crying for no reason    · Feeling anxious  Breast care for nursing mothers: You may have sore breasts for 3 to 6 days after you give birth  This happens as your milk begins to fill your breasts  You may also have sore breasts if you do not breastfeed frequently  Do the following to care for your breasts:  · Apply a moist, warm, compress to your breast as directed  This may help soothe your breasts   Make sure the washcloth is not too hot before you apply it to your breast      · Nurse your baby or pump your milk frequently  This may prevent clogged milk ducts  Ask your healthcare provider how often to nurse or pump  · Massage your breasts as directed  This may help increase your milk flow  Gently rub your breasts in a circular motion before you breastfeed  You may need to gently squeeze your breast or nipple to help release milk  You can also use a breast pump to help release milk from your breast      · Wash your breasts with warm water only  Do not put soap on your nipples  Soap may cause your nipples to become dry  · Apply lanolin cream to your nipples as directed  Lanolin cream may add moisture to your skin and prevent nipple dryness  Always  wash off lanolin cream with warm water before you breastfeed  · Place pads in your bra  Your nipples may leak milk when you are not breastfeeding  You can place pads inside of your bra to help prevent leaking onto your clothing  Ask your healthcare provider where to purchase bra pads  · Get breastfeeding support if needed  There are healthcare providers who can answer questions about breastfeeding and provide you with support  Ask your healthcare provider who you can contact if you need breastfeeding support  Breast care for non-nursing mothers:  Milk will fill your breasts even if you bottle feed your baby  Do the following to help stop your milk from filling your breasts and causing pain:  · Wear a bra with support at all times  A sports bra or a tight-fitting bra will help stop your milk from coming in  · Apply ice on each breast for 15 to 20 minutes every hour or as directed  Use an ice pack, or put crushed ice in a plastic bag  Cover it with a towel  Ice helps your milk ducts shrink  · Keep your breasts away from warm water  Warm water will make it easier for milk to fill your breasts  Stand with your breasts away from warm water in the shower       · Limit how much you touch your breasts  This will prevent them from filling with milk  Perineum care: Your perineum is the area between your rectum and vagina  It is normal to have swelling and pain in this area after you give birth  If you had an episiotomy, your healthcare provider may give you special instructions  · Clean your perineum after you use the bathroom  This may prevent infection and help with healing  Use a spray bottle with warm water to clean your perineum  You may also gently spray warm water against your perineum when you urinate  Always wipe front to back  · Take a sitz bath as directed  A sitz bath may help relieve swelling and pain  Fill your bath tub or bucket with water up to your hips and sit in the water  Use cold water for 2 days after you deliver  Then use warm water  Ask your healthcare provider for more information about a sitz bath  · Apply ice packs for the first 24 hours or as directed  Use a plastic glove filled with ice or buy an ice pack  Wrap the ice pack or plastic glove in a small towel or wash cloth  Place the ice pack on your perineum for 20 minutes at a time  · Sit on a donut-shaped pillow  This may relieve pressure on your perineum when you sit  · Use wipes with medicine or take pills as directed  Your healthcare provider may tell you to use witch hazel pads  You can place witch hazel pads in the refrigerator before you apply them to your perineum  He may also tell you to take NSAIDs  Ask your healthcare provider how often to take pills or use wipes with medicine  · Do not go swimming or take tub baths for 4 to 6 weeks or as directed  This will help prevent an infection in your vagina or uterus  Bowel and bladder care: It may take 3 to 5 days to have a bowel movement after you deliver your baby  You can do the following to prevent or manage constipation, and get control of your bowel or bladder:  · Take stool softeners as directed    A stool softener is medicine that will make your bowel movements softer  This may prevent or relieve constipation  A stool softener may also make bowel movements less painful  · Drink plenty of liquids  Ask how much liquid to drink each day and which liquids are best for you  Liquids may help prevent constipation  · Eat foods high in fiber  Examples include fruits, vegetables, grains, beans, and lentils  Ask your healthcare provider how much fiber you need each day  Fiber may prevent constipation  · Do Kegel exercises as directed  Kegel exercises will help strengthen the muscles that control bowel movements and urination  Ask your healthcare provider for more information on Kegel exercises  · Apply cold compresses or medicine to hemorrhoids as directed  This may relieve swelling and pain  Your healthcare provider may tell you to apply ice or wipes with medicine to your hemorrhoids  He may also tell you to use a sitz bath  Ask your healthcare for more information on how to manage hemorrhoids  Nutrition:  Good nutrition is important in the postpartum period  It will help you return to a healthy weight, increase your energy levels, and prevent constipation  It will also help you get enough nutrients and calories if you are going to breastfeed your baby  · Eat a variety of healthy foods  Healthy foods include fruits, vegetables, whole-grain breads, low-fat dairy products, beans, lean meats, and fish  You may need 500 to 700 additional calories each day if you breastfeed your baby  You may also need extra protein  · Limit foods with added sugar and high amounts of fat  These foods are high in calories and low in healthy nutrients  Read food labels so you know how much sugar and fat is in the food you want to eat  · Drink 8 to 10 glasses of water per day  Water will help you make plenty of milk for your baby  It will also help prevent constipation   Drink a glass of water every time you breastfeed your baby      · Take vitamins as directed  Ask your healthcare provider what vitamins you need  · Limit caffeine and alcohol if you are breastfeeding  Caffeine and alcohol can get into your breast milk  Caffeine and alcohol can make your baby fussy  They can also interfere with your baby's sleep  Ask your healthcare provider if you can drink alcohol or caffeine  Rest and sleep: You may feel very tired in the postpartum period  Enough sleep will help you heal and give you energy to care for your baby  The following may help you get sleep and rest:  · Nap when your baby naps  Your baby may nap several times during the day  Get rest during this time  · Limit visitors  Many people may want to see you and your baby  Ask friends or family to visit on different days  This will give you time to rest      · Do not plan too much for one day  Put off household chores so that you have time to rest  Gradually do more each day  · Ask for help from family, friends, or neighbors  Ask them to help you with laundry, cleaning, or errands  Also ask someone to watch the baby while you take a nap or relax  Ask your partner to help with the care of your baby  Pump some of your breast milk so your partner can feed your baby during the night  Exercise after delivery:  Wait until your healthcare provider says it is okay to exercise  Exercise can help you lose weight, increase your energy levels, and manage your mood  It can also prevent constipation and blood clots  Start with gentle exercises such as walking  Do more as you have more energy  You may need to avoid abdominal exercises for 1 to 2 weeks after you deliver  Talk to your healthcare provider about an exercise plan that is right for you  Sexual activity after delivery:   · Do not have sex until your healthcare provider says it is okay  You may need to wait 4 to 6 weeks before you have sex   This may prevent infection and allow time to heal      · Your menstrual cycle may begin as soon as 3 weeks after you deliver  Your period may be delayed if you breastfeed your baby  You can become pregnant before you get your first postpartum period  Talk to your healthcare provider about birth control that is right for you  Some types of birth control are not safe during breastfeeding  For support and more information:  Join a support group for new mothers  Ask for help from family and friends with chores, errands, and care of your baby  · Office of Women's Health,  Department of Health and Human Services  [address], [id number] [place] [address]  [address], [address] [place] [address]  Phone: [phone]  Web Address: www womenshealth gov  · [facility] Postpartum [address] [facility] , [address] [place] [address] Road  [address] [facility] , [address] [place] [address]  Web Address: [email or web address]  Follow up with your obstetrician or midwife as directed: You will need to follow up with your healthcare provider in 2 to 6 weeks after delivery  Write down your questions so you remember to ask them at your visits  © 2017 [address] [employer]  Information is for End User's use only and may not be sold, redistributed or otherwise used for commercial purposes  All illustrations and images included in CareNotes® are the copyrighted property of [employer]  or [employer]  The above information is an  only  It is not intended as medical advice for individual conditions or treatments  Talk to your doctor, nurse or pharmacist before following any medical regimen to see if it is safe and effective for you

## 2019-09-25 VITALS
RESPIRATION RATE: 18 BRPM | HEART RATE: 56 BPM | TEMPERATURE: 97.6 F | DIASTOLIC BLOOD PRESSURE: 74 MMHG | WEIGHT: 153 LBS | BODY MASS INDEX: 28.89 KG/M2 | SYSTOLIC BLOOD PRESSURE: 129 MMHG | HEIGHT: 61 IN

## 2019-09-25 LAB — MRSA NOSE QL CULT: NORMAL

## 2019-09-25 PROCEDURE — 99024 POSTOP FOLLOW-UP VISIT: CPT | Performed by: OBSTETRICS & GYNECOLOGY

## 2019-09-25 PROCEDURE — 90715 TDAP VACCINE 7 YRS/> IM: CPT | Performed by: OBSTETRICS & GYNECOLOGY

## 2019-09-25 RX ORDER — CALCIUM CARBONATE 200(500)MG
1000 TABLET,CHEWABLE ORAL DAILY PRN
Refills: 0 | Status: CANCELLED
Start: 2019-09-25

## 2019-09-25 RX ORDER — DOCUSATE SODIUM 100 MG/1
100 CAPSULE, LIQUID FILLED ORAL 2 TIMES DAILY
Qty: 10 CAPSULE | Refills: 0 | Status: CANCELLED
Start: 2019-09-25

## 2019-09-25 RX ORDER — IBUPROFEN 600 MG/1
600 TABLET ORAL EVERY 6 HOURS PRN
Qty: 30 TABLET | Refills: 0 | Status: CANCELLED | OUTPATIENT
Start: 2019-09-25

## 2019-09-25 RX ADMIN — IBUPROFEN 600 MG: 600 TABLET ORAL at 15:39

## 2019-09-25 RX ADMIN — FLUOXETINE 30 MG: 20 CAPSULE ORAL at 09:02

## 2019-09-25 RX ADMIN — OXYCODONE HYDROCHLORIDE 5 MG: 5 TABLET ORAL at 04:42

## 2019-09-25 RX ADMIN — ACETAMINOPHEN 650 MG: 325 TABLET ORAL at 12:17

## 2019-09-25 RX ADMIN — IBUPROFEN 600 MG: 600 TABLET ORAL at 02:40

## 2019-09-25 RX ADMIN — TETANUS TOXOID, REDUCED DIPHTHERIA TOXOID AND ACELLULAR PERTUSSIS VACCINE, ADSORBED 0.5 ML: 5; 2.5; 8; 8; 2.5 SUSPENSION INTRAMUSCULAR at 18:08

## 2019-09-25 RX ADMIN — DOCUSATE SODIUM 100 MG: 100 CAPSULE, LIQUID FILLED ORAL at 09:04

## 2019-09-25 RX ADMIN — IBUPROFEN 600 MG: 600 TABLET ORAL at 09:00

## 2019-09-25 RX ADMIN — DOCUSATE SODIUM 100 MG: 100 CAPSULE, LIQUID FILLED ORAL at 18:04

## 2019-09-25 NOTE — PROGRESS NOTES
Progress Note - OB/GYN   Marcos Martino 30 y o  female MRN: [id number]  Unit/Bed#: [facility] 874-01 Encounter: [id number]    Assessment:  Post partum Day #2 s/p , stable, baby in NICU    Plan:  1) Depression/anxiety   - Prozac 30mg daily    2) Continue routine post partum care   - Encourage ambulation   - Encourage breastfeeding   - Anticipate discharge home today     Subjective/Objective   Chief Complaint:     Post delivery  Patient is doing well  Lochia WNL  Pain well controlled  Subjective:     Pain: yes, cramping, improved with meds  Tolerating PO: yes  Voiding: yes  Ambulating: yes  Breastfeeding:  yes  Chest pain: no  Shortness of breath: no  Leg pain: no  Lochia: WNL    Objective:     Vitals: /78 (BP Location: Right arm)   Pulse (!) 52   Temp 97 8 °F (36 6 °C) (Tympanic)   Resp 20   Ht 5' 1" (1 549 m)   Wt 69 4 kg (153 lb)   LMP 2019   Breastfeeding?  Yes   BMI 28 91 kg/m²     No intake or output data in the 24 hours ending 19 0605    Lab Results   Component Value Date    WBC 8 67 2019    HGB 10 1 (L) 2019    HCT 32 0 (L) 2019    MCV 75 (L) 2019     2019       Physical Exam:     Gen: AAOx3, NAD  CV: RRR  Lungs: CTA b/l  Abd: patient sitting in the NICU, did not do abdominal exam  Ext: Non tender        Faviola Bowens MD  OB/GYN PGY-2  2019  6:05 AM

## 2019-09-25 NOTE — LACTATION NOTE
DC books provided  Plan for infant to DC from NICU tomorrow  Emphasized 8 or more (12) feedings in a 24 hour period, what to expect for the number of diapers per day of life and the progression of properties of the  stooling pattern  Discussed s/s that breastfeeding is going well after day 4 and when to get help from a pediatrician or lactation support person after day 4  Booklet included Breast Pumping Instructions, When You Go Back to Work or School, and Breastfeeding Resources for after discharge including access to the number for the [employer]  Discussed s/s engorgement and how to manage with medications and cool compresses as well as s/s mastitis and when to contact physician

## 2019-09-25 NOTE — PLAN OF CARE
Problem: Knowledge Deficit  Goal: Verbalizes understanding of labor plan  Description  Assess patient/family/caregiver's baseline knowledge level and ability to understand information  Provide education via patient/family/caregiver's preferred learning method at appropriate level of understanding  1  Provide teaching at level of understanding  2  Provide teaching via preferred learning method(s)  Outcome: Progressing  Goal: Patient/family/caregiver demonstrates understanding of disease process, treatment plan, medications, and discharge instructions  Description  Complete learning assessment and assess knowledge base    Interventions:  - Provide teaching at level of understanding  - Provide teaching via preferred learning methods  Outcome: Progressing     Problem: POSTPARTUM  Goal: Experiences normal postpartum course  Description  INTERVENTIONS:  - Monitor maternal vital signs  - Assess uterine involution and lochia  Outcome: Progressing  Goal: Appropriate maternal -  bonding  Description  INTERVENTIONS:  - Identify family support  - Assess for appropriate maternal/infant bonding   -Encourage maternal/infant bonding opportunities  - Referral to  or  as needed  Outcome: Progressing  Goal: Establishment of infant feeding pattern  Description  INTERVENTIONS:  - Assess breast/bottle feeding  - Refer to lactation as needed  Outcome: Progressing  Goal: Incision(s), wounds(s) or drain site(s) healing without S/S of infection  Description  INTERVENTIONS  - Assess and document risk factors for skin impairment   - Assess and document dressing, incision, wound bed, drain sites and surrounding tissue  - Consider nutrition services referral as needed  - Oral mucous membranes remain intact  - Provide patient/ family education  Outcome: Progressing     Problem: ALTERATION IN THE BREAST  Goal: Optimize infant feeding at the breast  Description  INTERVENTIONS:  - Latch, breast and nipple assessment  - Assess prior breast feeding history  - Hand expression of breast milk  - For cracked, bleeding and or sore nipples reassess latch, treat damaged nipple  -Educate mother on feeding cues  -Positioning/latch techniques  Outcome: Progressing     Problem: INADEQUATE LATCH, SUCK OR SWALLOW  Goal: Demonstrate ability to latch and sustain latch, audible swallowing and satiety  Description  INTERVENTIONS:  - Assess oral anatomy, notify Physician/AP for abnormal findings  - Establish milk expression  - Maximize feeding opportunity (skin to skin, behavioral state)  - Position/latch techniques  - Discourage use of pacifier-artificial nipple  - Mechanical pumping  - Nipple Shield  - Supplemental formula feeding (Physician/AP order)  - Alternative feeding method  Outcome: Progressing     Problem: PAIN - ADULT  Goal: Verbalizes/displays adequate comfort level or baseline comfort level  Description  Interventions:  - Encourage patient to monitor pain and request assistance  - Assess pain using appropriate pain scale  - Administer analgesics based on type and severity of pain and evaluate response  - Implement non-pharmacological measures as appropriate and evaluate response  - Consider cultural and social influences on pain and pain management  - Notify physician/advanced practitioner if interventions unsuccessful or patient reports new pain  Outcome: Progressing     Problem: INFECTION - ADULT  Goal: Absence or prevention of progression during hospitalization  Description  INTERVENTIONS:  - Assess and monitor for signs and symptoms of infection  - Monitor lab/diagnostic results  - Monitor all insertion sites, i e  indwelling lines, tubes, and drains  - Monitor endotracheal if appropriate and nasal secretions for changes in amount and color  - [place] appropriate cooling/warming therapies per order  - Administer medications as ordered  - Instruct and encourage patient and family to use good hand hygiene technique  - Identify and instruct in appropriate isolation precautions for identified infection/condition  Outcome: Progressing  Goal: Absence of fever/infection during neutropenic period  Description  INTERVENTIONS:  - Monitor WBC    Outcome: Progressing     Problem: SAFETY ADULT  Goal: Patient will remain free of falls  Description  INTERVENTIONS:  - Assess patient frequently for physical needs  -  Identify cognitive and physical deficits and behaviors that affect risk of falls    -  [place] fall precautions as indicated by assessment   - Educate patient/family on patient safety including physical limitations  - Instruct patient to call for assistance with activity based on assessment  - Modify environment to reduce risk of injury  - Consider OT/PT consult to assist with strengthening/mobility  Outcome: Progressing  Goal: Maintain or return to baseline ADL function  Description  INTERVENTIONS:  -  Assess patient's ability to carry out ADLs; assess patient's baseline for ADL function and identify physical deficits which impact ability to perform ADLs (bathing, care of mouth/teeth, toileting, grooming, dressing, etc )  - Assess/evaluate cause of self-care deficits   - Assess range of motion  - Assess patient's mobility; develop plan if impaired  - Assess patient's need for assistive devices and provide as appropriate  - Encourage maximum independence but intervene and supervise when necessary  - Involve family in performance of ADLs  - Assess for home care needs following discharge   - Consider OT consult to assist with ADL evaluation and planning for discharge  - Provide patient education as appropriate  Outcome: Progressing  Goal: Maintain or return mobility status to optimal level  Description  INTERVENTIONS:  - Assess patient's baseline mobility status (ambulation, transfers, stairs, etc )    - Identify cognitive and physical deficits and behaviors that affect mobility  - Identify mobility aids required to assist with transfers and/or ambulation (gait belt, sit-to-stand, lift, walker, cane, etc )  - [place] fall precautions as indicated by assessment  - Record patient progress and toleration of activity level on Mobility SBAR; progress patient to next Phase/Stage  - Instruct patient to call for assistance with activity based on assessment  - Consider rehabilitation consult to assist with strengthening/weightbearing, etc   Outcome: Progressing     Problem: DISCHARGE PLANNING  Goal: Discharge to home or other facility with appropriate resources  Description  INTERVENTIONS:  - Identify barriers to discharge w/patient and caregiver  - Arrange for needed discharge resources and transportation as appropriate  - Identify discharge learning needs (meds, wound care, etc )  - Arrange for interpretive services to assist at discharge as needed  - Refer to Case Management Department for coordinating discharge planning if the patient needs post-hospital services based on physician/advanced practitioner order or complex needs related to functional status, cognitive ability, or social support system  Outcome: Progressing

## 2019-09-25 NOTE — SOCIAL WORK
Consult(s): Seen for baby transferred to NICU  Completed breast pump consult yesterday   Baby's name/gender: Maribel Bonilla (not Pura Aquino ), boy   Delivery method/date: vaginal 9/23   Gestational Age: 38w   NICU/Nursery: nursery then NICU for hypoglycemia    CM met with pt and FOB to introduce CM services, complete assessment, and provide CM contact info  Pt reported the following:      Pt/Mother of baby: Sylvia Sevillaudent cell [phone]   Father of baby:  Sherita Etienne cell [phone]   Other Legal Guardian(s) for baby: no   Alternate emergency contact: no   Other children: yes, 3and 10year old daughters  Louise Brito Lives with:  and kids   Support System: family, friends   Baby Supplies: have all supplies   Bottle or Breast Feeding: breast   Breast Pump if breast feeding: got [employer] breast pump as requested yesterday  [employer] Assistance Programs/WIC/EBT/SSI: no    Childcare: in the home by pt   Work/School: FOB is employed  [employer]: they have cars and drive   Pediatrician: [place] Hx or Treatment: denies hx PPD but reports she does have depression for which she sees a psychiatrist and therapist regularly; she denies any current MH issues, symptoms, or concerns   Substance Abuse: denies   Legal Issues: denies   Community Referrals, C&Y, VNA: denies  [employer] for baby: will call to add to [facility]   POA/LW: denies     CM reviewed d/c planning process including the following: identifying help at home, patient preference for d/c planning needs, Discharge Mell[place] kan Meds to Bed program, availability of treatment team to discuss questions or concerns patient and/or family may have regarding understanding medications and recognizing signs and symptoms once discharged  CM also encouraged patient to follow up with all recommended appointments after discharge   Patient advised of importance for patient and family to participate in managing patients medical well being  Pt denies any other CM needs at this time  Encouraged family to contact CM as needed  No other CM needs noted for d/c home when medically cleared

## 2019-09-25 NOTE — PLAN OF CARE
Problem: Knowledge Deficit  Goal: Verbalizes understanding of labor plan  Description  Assess patient/family/caregiver's baseline knowledge level and ability to understand information  Provide education via patient/family/caregiver's preferred learning method at appropriate level of understanding  1  Provide teaching at level of understanding  2  Provide teaching via preferred learning method(s)  2019 by Diamond Herrera RN  Outcome: Completed  2019 by Diamond Herrera RN  Outcome: Progressing  Goal: Patient/family/caregiver demonstrates understanding of disease process, treatment plan, medications, and discharge instructions  Description  Complete learning assessment and assess knowledge base    Interventions:  - Provide teaching at level of understanding  - Provide teaching via preferred learning methods  2019 by Diamond Herrera RN  Outcome: Completed  2019 by Diamond Herrera RN  Outcome: Progressing     Problem: POSTPARTUM  Goal: Experiences normal postpartum course  Description  INTERVENTIONS:  - Monitor maternal vital signs  - Assess uterine involution and lochia  2019 by Diamond Herrera RN  Outcome: Completed  2019 by Diamond Herrera RN  Outcome: Progressing  Goal: Appropriate maternal -  bonding  Description  INTERVENTIONS:  - Identify family support  - Assess for appropriate maternal/infant bonding   -Encourage maternal/infant bonding opportunities  - Referral to  or  as needed  2019 by Diamond Herrera RN  Outcome: Completed  2019 by Diamond Herrera RN  Outcome: Progressing  Goal: Establishment of infant feeding pattern  Description  INTERVENTIONS:  - Assess breast/bottle feeding  - Refer to lactation as needed  2019 by Diamond Herrera RN  Outcome: Completed  2019 by Diamond Herrera RN  Outcome: Progressing  Goal: Incision(s), wounds(s) or drain site(s) healing without S/S of infection  Description  INTERVENTIONS  - Assess and document risk factors for skin impairment   - Assess and document dressing, incision, wound bed, drain sites and surrounding tissue  - Consider nutrition services referral as needed  - Oral mucous membranes remain intact  - Provide patient/ family education  9/25/2019 1955 by Naila Guillaume RN  Outcome: Completed  9/25/2019 1211 by Naila Guillaume RN  Outcome: Progressing     Problem: ALTERATION IN THE BREAST  Goal: Optimize infant feeding at the breast  Description  INTERVENTIONS:  - Latch, breast and nipple assessment  - Assess prior breast feeding history  - Hand expression of breast milk  - For cracked, bleeding and or sore nipples reassess latch, treat damaged nipple  -Educate mother on feeding cues  -Positioning/latch techniques  9/25/2019 1955 by Naila Guillaume RN  Outcome: Completed  9/25/2019 1211 by Naila Guillaume RN  Outcome: Progressing     Problem: INADEQUATE LATCH, SUCK OR SWALLOW  Goal: Demonstrate ability to latch and sustain latch, audible swallowing and satiety  Description  INTERVENTIONS:  - Assess oral anatomy, notify Physician/AP for abnormal findings  - Establish milk expression  - Maximize feeding opportunity (skin to skin, behavioral state)  - Position/latch techniques  - Discourage use of pacifier-artificial nipple  - Mechanical pumping  - Nipple Shield  - Supplemental formula feeding (Physician/AP order)  - Alternative feeding method  9/25/2019 1955 by Naila Guillaume RN  Outcome: Completed  9/25/2019 1211 by Naila Guillaume RN  Outcome: Progressing     Problem: PAIN - ADULT  Goal: Verbalizes/displays adequate comfort level or baseline comfort level  Description  Interventions:  - Encourage patient to monitor pain and request assistance  - Assess pain using appropriate pain scale  - Administer analgesics based on type and severity of pain and evaluate response  - Implement non-pharmacological measures as appropriate and evaluate response  - Consider cultural and social influences on pain and pain management  - Notify physician/advanced practitioner if interventions unsuccessful or patient reports new pain  9/25/2019 1955 by Clarke Sarkar RN  Outcome: Completed  9/25/2019 1211 by Clarke Sarkar RN  Outcome: Progressing     Problem: INFECTION - ADULT  Goal: Absence or prevention of progression during hospitalization  Description  INTERVENTIONS:  - Assess and monitor for signs and symptoms of infection  - Monitor lab/diagnostic results  - Monitor all insertion sites, i e  indwelling lines, tubes, and drains  - Monitor endotracheal if appropriate and nasal secretions for changes in amount and color  - [place] appropriate cooling/warming therapies per order  - Administer medications as ordered  - Instruct and encourage patient and family to use good hand hygiene technique  - Identify and instruct in appropriate isolation precautions for identified infection/condition  9/25/2019 1955 by Clarke Sarkar RN  Outcome: Completed  9/25/2019 1211 by Clarke Sarkar RN  Outcome: Progressing  Goal: Absence of fever/infection during neutropenic period  Description  INTERVENTIONS:  - Monitor WBC    9/25/2019 1955 by Clarke Sarkar RN  Outcome: Completed  9/25/2019 1211 by Clarke Sarkar RN  Outcome: Progressing     Problem: SAFETY ADULT  Goal: Patient will remain free of falls  Description  INTERVENTIONS:  - Assess patient frequently for physical needs  -  Identify cognitive and physical deficits and behaviors that affect risk of falls    -  [place] fall precautions as indicated by assessment   - Educate patient/family on patient safety including physical limitations  - Instruct patient to call for assistance with activity based on assessment  - Modify environment to reduce risk of injury  - Consider OT/PT consult to assist with strengthening/mobility  9/25/2019 1955 by Clarke Sarkar RN  Outcome: Completed  9/25/2019 1211 by Carrol Perez RN  Outcome: Progressing  Goal: Maintain or return to baseline ADL function  Description  INTERVENTIONS:  -  Assess patient's ability to carry out ADLs; assess patient's baseline for ADL function and identify physical deficits which impact ability to perform ADLs (bathing, care of mouth/teeth, toileting, grooming, dressing, etc )  - Assess/evaluate cause of self-care deficits   - Assess range of motion  - Assess patient's mobility; develop plan if impaired  - Assess patient's need for assistive devices and provide as appropriate  - Encourage maximum independence but intervene and supervise when necessary  - Involve family in performance of ADLs  - Assess for home care needs following discharge   - Consider OT consult to assist with ADL evaluation and planning for discharge  - Provide patient education as appropriate  9/25/2019 1955 by Carrol Perez RN  Outcome: Completed  9/25/2019 1211 by Carrol Perez RN  Outcome: Progressing  Goal: Maintain or return mobility status to optimal level  Description  INTERVENTIONS:  - Assess patient's baseline mobility status (ambulation, transfers, stairs, etc )    - Identify cognitive and physical deficits and behaviors that affect mobility  - Identify mobility aids required to assist with transfers and/or ambulation (gait belt, sit-to-stand, lift, walker, cane, etc )  - [place] fall precautions as indicated by assessment  - Record patient progress and toleration of activity level on Mobility SBAR; progress patient to next Phase/Stage  - Instruct patient to call for assistance with activity based on assessment  - Consider rehabilitation consult to assist with strengthening/weightbearing, etc   9/25/2019 1955 by Carrol Perez RN  Outcome: Completed  9/25/2019 1211 by Carrol Perez RN  Outcome: Progressing     Problem: DISCHARGE PLANNING  Goal: Discharge to home or other facility with appropriate resources  Description  INTERVENTIONS:  - Identify barriers to discharge w/patient and caregiver  - Arrange for needed discharge resources and transportation as appropriate  - Identify discharge learning needs (meds, wound care, etc )  - Arrange for interpretive services to assist at discharge as needed  - Refer to Case Management Department for coordinating discharge planning if the patient needs post-hospital services based on physician/advanced practitioner order or complex needs related to functional status, cognitive ability, or social support system  9/25/2019 1955 by Tayler Flynn, RN  Outcome: Completed  9/25/2019 1211 by Tayler Flynn, RN  Outcome: Progressing

## 2019-09-26 ENCOUNTER — TRANSITIONAL CARE MANAGEMENT (OUTPATIENT)
Dept: FAMILY MEDICINE CLINIC | Facility: MEDICAL CENTER | Age: 34
End: 2019-09-26

## 2019-09-26 NOTE — UTILIZATION REVIEW
Notification of Maternity Inpatient Admission/Maternity Inpatient Authorization Request  This is a Notification of Maternity Inpatient Admission/Maternity Inpatient Authorization Request to our facility [facility] [address]  Please be advised that this patient is currently in our facility under Inpatient Status  Below you will find the Birth/[place] Summary, Attending Physician and Facilitys information including NPI#  and contact information for the Utilization Review Department where the patient is receiving care services  Facility: [facility] [address]  Address: [address] [place] [address]  Phone: [phone] Tax ID: [id number]  NPI: [id number]  MEDICARE ID: [id number]    Place of Service Code: 24   Place of Service Name: Inpatient Hospital  Presentation Date & Time: 2019  3:25 PM  Inpatient Admission Date & Time: 19 1605  Discharge Date & Time: 2019  7:59 PM   Discharge Disposition (if discharged): Home/Self Care  Attending Physician & NPI: Bruce Olvera, Caro Myers [[id number]]  Attending Physician:  PHILOMENA Norton  Specialty- Obstetrics and Gynecology  [facility] ID- [id number]  [address]   [facility] [address] [facility]  Phone 1: [phone]  Fax: [phone]  Mother of [place] Information: Maikol Huerta   MRN: [id number] YOB: 1985   Mother's Admitting Diagnosis: 39 weeks gestation of pregnancy [Z3A 36]  Premature rupture of membranes, unspecified as to length of time between rupture and onset of labor, unspecified weeks of gestation [O42 90]  Estimated Date of Delivery: 10/15/19  Type of Delivery: Vaginal, Spontaneous  Delivering clinician: Bruce Olvera   OB History        3    Para   3    Term   1       2    AB        Living   3       SAB        TAB        Ectopic        Multiple   0    Live Births   3               [place] Name & MRN:   Information for the patient's :  Shelly Heck [address]) [[id number]]     [place] Delivery Information:  Sex: male  Delivered 2019 10:39 PM by Vaginal, Spontaneous; Gestational Age: 36w7d    [place] Measurements:  Weight: 6 lb 3 oz (2807 g); Height: 17 5"    APGAR 1 minute 5 minutes 10 minutes   Totals: 8 9      Thank you,  [address] [place] [address] Utilization Review Department  Phone: [phone]; Fax [phone]  ATTENTION: Please call with any questions or concerns to [phone]  and carefully follow the prompts so that you are directed to the right person  Send all requests for admission clinical reviews, approved or denied determinations and any other requests to fax [phone]   All voicemails are confidential

## 2019-09-27 NOTE — UTILIZATION REVIEW
Discharge Summary - OB/GYN   Kyler Elliott 30 y o  female MRN: [id number]  Unit/Bed#: PEDS 874-01 Encounter: [id number]        Admission Date: 2019      Discharge Date: 2019     Admitting Diagnosis:   1  Pregnancy at 36w6d  2  Depression and anxiety      Discharge Diagnosis:   Same, delivered     Procedures: spontaneous vaginal delivery     Admitting and Delivery Attending: Whitley Ramos MD   Discharge Attending: [facility] Course:      Ms Kyler Elliott is a 30 y o  [id number] at 36wk6d  She presented to labor and delivery with , premature rupture of membranes  She was started on a pitocin titration and received an epidural for pain management  She progressed to complete cervical dilation  Prior to being complete, patient's FHT showed a prolonged deceleration  She was repositioned to all fours, given oxygen, a fluid bolus, and pitocin was stopped  The baseline recovered and patient was placed on her back to begin pushing       She delivered a viable male  on 19 at 2239  Weight 6lbs 3oz via spontaneous vaginal delivery  Apgars were 8 (1 min) and 9 (5 min)   stayed in the room after birth, but was eventually transferred to the NICU  Patient tolerated the procedure well and was transferred to recovery in stable condition       Her post-partum course was uncomplicated  Her post-partum pain was well controlled with oral analgesics      On day of discharge, she was ambulating and able to reasonably perform all ADLs  She was voiding and had appropriate bowel function  Pain was well controlled  She was discharged home on post-partum day #2 without complications   Patient was instructed to follow up with her OB as an outpatient and was given appropriate warnings to call provider if she develops signs of infection or uncontrolled pain      Complications: none apparent     Condition at discharge: good      Discharge instructions/Information to patient and family:   See after visit summary for information provided to patient and family        Provisions for Follow-Up Care:  See after visit summary for information related to follow-up care and any pertinent home health orders        Disposition: Home     Planned Readmission: No     Discharge Medications:   For a complete list of the patient's medications, please refer to her med rec

## 2019-10-30 ENCOUNTER — TELEPHONE (OUTPATIENT)
Dept: OBGYN CLINIC | Facility: CLINIC | Age: 34
End: 2019-10-30

## 2020-02-06 ENCOUNTER — OFFICE VISIT (OUTPATIENT)
Dept: FAMILY MEDICINE CLINIC | Facility: MEDICAL CENTER | Age: 35
End: 2020-02-06
Payer: COMMERCIAL

## 2020-02-06 VITALS
HEART RATE: 80 BPM | RESPIRATION RATE: 16 BRPM | HEIGHT: 61 IN | DIASTOLIC BLOOD PRESSURE: 68 MMHG | OXYGEN SATURATION: 98 % | TEMPERATURE: 97.8 F | SYSTOLIC BLOOD PRESSURE: 124 MMHG | BODY MASS INDEX: 29.45 KG/M2 | WEIGHT: 156 LBS

## 2020-02-06 DIAGNOSIS — J01.90 ACUTE NON-RECURRENT SINUSITIS, UNSPECIFIED LOCATION: Primary | ICD-10-CM

## 2020-02-06 PROBLEM — O09.299 HX OF PREECLAMPSIA, PRIOR PREGNANCY, CURRENTLY PREGNANT: Status: RESOLVED | Noted: 2019-04-05 | Resolved: 2020-02-06

## 2020-02-06 PROBLEM — F41.9 ANXIETY: Status: ACTIVE | Noted: 2020-02-06

## 2020-02-06 PROBLEM — Z3A.36 36 WEEKS GESTATION OF PREGNANCY: Status: RESOLVED | Noted: 2019-09-23 | Resolved: 2020-02-06

## 2020-02-06 PROBLEM — O99.342 DEPRESSION AFFECTING PREGNANCY IN SECOND TRIMESTER, ANTEPARTUM: Status: RESOLVED | Noted: 2019-05-31 | Resolved: 2020-02-06

## 2020-02-06 PROBLEM — Z3A.34 34 WEEKS GESTATION OF PREGNANCY: Status: RESOLVED | Noted: 2019-09-06 | Resolved: 2020-02-06

## 2020-02-06 PROBLEM — F32.A DEPRESSION: Status: ACTIVE | Noted: 2018-03-01

## 2020-02-06 PROBLEM — O09.293 PREVIOUS PREGNANCY COMPLICATED BY INTRAUTERINE GROWTH RESTRICTION, ANTEPARTUM, THIRD TRIMESTER: Status: RESOLVED | Noted: 2019-05-31 | Resolved: 2020-02-06

## 2020-02-06 PROBLEM — F32.A DEPRESSION AFFECTING PREGNANCY IN SECOND TRIMESTER, ANTEPARTUM: Status: RESOLVED | Noted: 2019-05-31 | Resolved: 2020-02-06

## 2020-02-06 PROCEDURE — 1036F TOBACCO NON-USER: CPT | Performed by: FAMILY MEDICINE

## 2020-02-06 PROCEDURE — 3008F BODY MASS INDEX DOCD: CPT | Performed by: FAMILY MEDICINE

## 2020-02-06 PROCEDURE — 99214 OFFICE O/P EST MOD 30 MIN: CPT | Performed by: FAMILY MEDICINE

## 2020-02-06 RX ORDER — AMOXICILLIN 500 MG/1
500 CAPSULE ORAL 3 TIMES DAILY
Qty: 21 CAPSULE | Refills: 0 | Status: SHIPPED | OUTPATIENT
Start: 2020-02-06 | End: 2020-02-13

## 2020-02-06 NOTE — PROGRESS NOTES
Assessment/Plan:       Diagnoses and all orders for this visit:    Acute non-recurrent sinusitis, unspecified location  -     amoxicillin (AMOXIL) 500 mg capsule; Take 1 capsule (500 mg total) by mouth 3 (three) times a day for 7 days        New onset sinusitis located in the right maxillary sinus and right frontal sinus requiring treatment  Patient will be started on amoxicillin 500 mg 3 times daily for seven days  She was encouraged to eat yogurt to help prevent antibiotic associated diarrhea  She was also instructed to start Monistat if she feels a vaginal yeast infection developing while on or shortly after finishing antibiotics  BMI Counseling: Body mass index is 29 48 kg/m²  The BMI is above normal  Nutrition recommendations include decreasing overall calorie intake  Exercise recommendations include moderate aerobic physical activity for 150 minutes/week  Patient states she did receive the flu vaccine at her local pharmacy  We will attempt to get those records  Follow-up in one week if symptoms persist or sooner if needed  Subjective:      Patient ID: Abby Adorno is a 30 y o  female  Patient presents with a chief complaint of sinus pain  Location is the right side of her face near her forehead and cheek  Symptoms started about five days ago  Described as an intense pressure sensation  No associated fevers  Does have some associated nasal congestion, sore throat and cough  Symptoms have been getting progressively worse  Pain is improved with application of heat  The following portions of the patient's history were reviewed and updated as appropriate: She  has a past medical history of Anxiety, Depression, Postpartum depression, Preeclampsia, and Varicella    She   Patient Active Problem List    Diagnosis Date Noted    Anxiety 02/06/2020    Mass of leg, right 04/09/2018    Mixed hyperlipidemia 03/08/2018    Environmental and seasonal allergies 03/01/2018    Depression 03/01/2018     She  has a past surgical history that includes [place] tooth extraction  Her family history includes COPD in her father; Hyperlipidemia in her father and mother; No Known Problems in her daughter; Vascular Disease in her daughter  She  reports that she quit smoking about 13 months ago  She has never used smokeless tobacco  She reports that she does not drink alcohol or use drugs  Current Outpatient Medications   Medication Sig Dispense Refill    Acetaminophen (TYLENOL PO) Take 1 tablet by mouth as needed      ALPRAZolam (XANAX) 1 mg tablet       cetirizine (ZyrTEC) 10 mg tablet Take 10 mg by mouth daily      FLUoxetine (PROzac) 20 mg capsule       Prenatal MV-Min-FA-Omega-3 (PRENATAL GUMMIES/DHA & FA PO) Take by mouth      amoxicillin (AMOXIL) 500 mg capsule Take 1 capsule (500 mg total) by mouth 3 (three) times a day for 7 days 21 capsule 0     No current facility-administered medications for this visit  Current Outpatient Medications on File Prior to Visit   Medication Sig    Acetaminophen (TYLENOL PO) Take 1 tablet by mouth as needed    ALPRAZolam (XANAX) 1 mg tablet     cetirizine (ZyrTEC) 10 mg tablet Take 10 mg by mouth daily    FLUoxetine (PROzac) 20 mg capsule     Prenatal MV-Min-FA-Omega-3 (PRENATAL GUMMIES/DHA & FA PO) Take by mouth     No current facility-administered medications on file prior to visit  She is allergic to eggs or egg-derived products       Review of Systems   Constitutional: Negative for fever  Respiratory: Negative for shortness of breath  Cardiovascular: Negative for chest pain  Objective:      /68 (BP Location: Left arm, Patient Position: Sitting, Cuff Size: Adult)   Pulse 80   Temp 97 8 °F (36 6 °C) (Oral)   Resp 16   Ht 5' 1" (1 549 m)   Wt 70 8 kg (156 lb)   SpO2 98%   BMI 29 48 kg/m²          Physical Exam   Constitutional: Vital signs are normal  She appears well-developed and well-nourished     HENT:   Head: Normocephalic and atraumatic  Right Ear: Tympanic membrane, external ear and ear canal normal  Right ear middle ear effusion: Serous fluid of the middle ear  Left Ear: Tympanic membrane, external ear and ear canal normal  Left ear middle ear effusion: Serous fluid of the middle ear  Nose: Mucosal edema and rhinorrhea present  Right sinus exhibits maxillary sinus tenderness and frontal sinus tenderness  Left sinus exhibits no maxillary sinus tenderness and no frontal sinus tenderness  Mouth/Throat: Uvula is midline and mucous membranes are normal  No oropharyngeal exudate (Postnasal drainage is however present  )  Eyes: Pupils are equal, round, and reactive to light  Conjunctivae, EOM and lids are normal    Neck: Trachea normal    Cardiovascular: Normal rate, regular rhythm and normal heart sounds  No murmur heard  Pulmonary/Chest: Effort normal and breath sounds normal    Lymphadenopathy:     She has cervical adenopathy  Right cervical: Superficial cervical adenopathy present  Left cervical: Superficial cervical adenopathy present  Neurological: She is alert  Skin: Skin is warm  Psychiatric: She has a normal mood and affect

## 2021-05-07 ENCOUNTER — OFFICE VISIT (OUTPATIENT)
Dept: OBGYN CLINIC | Facility: CLINIC | Age: 36
End: 2021-05-07
Payer: COMMERCIAL

## 2021-05-07 VITALS
RESPIRATION RATE: 14 BRPM | DIASTOLIC BLOOD PRESSURE: 72 MMHG | SYSTOLIC BLOOD PRESSURE: 110 MMHG | BODY MASS INDEX: 25.3 KG/M2 | WEIGHT: 134 LBS | HEIGHT: 61 IN

## 2021-05-07 DIAGNOSIS — Z11.3 SCREENING FOR STDS (SEXUALLY TRANSMITTED DISEASES): ICD-10-CM

## 2021-05-07 DIAGNOSIS — Z30.430 ENCOUNTER FOR INSERTION OF MIRENA IUD: Primary | ICD-10-CM

## 2021-05-07 DIAGNOSIS — Z97.5 IUD (INTRAUTERINE DEVICE) IN PLACE: ICD-10-CM

## 2021-05-07 LAB — SL AMB POCT URINE HCG: NORMAL

## 2021-05-07 PROCEDURE — 81025 URINE PREGNANCY TEST: CPT | Performed by: OBSTETRICS & GYNECOLOGY

## 2021-05-07 PROCEDURE — 1036F TOBACCO NON-USER: CPT | Performed by: OBSTETRICS & GYNECOLOGY

## 2021-05-07 PROCEDURE — 87491 CHLMYD TRACH DNA AMP PROBE: CPT | Performed by: OBSTETRICS & GYNECOLOGY

## 2021-05-07 PROCEDURE — 0503F POSTPARTUM CARE VISIT: CPT | Performed by: OBSTETRICS & GYNECOLOGY

## 2021-05-07 PROCEDURE — 87591 N.GONORRHOEAE DNA AMP PROB: CPT | Performed by: OBSTETRICS & GYNECOLOGY

## 2021-05-07 PROCEDURE — 58300 INSERT INTRAUTERINE DEVICE: CPT | Performed by: OBSTETRICS & GYNECOLOGY

## 2021-05-07 PROCEDURE — 99213 OFFICE O/P EST LOW 20 MIN: CPT | Performed by: OBSTETRICS & GYNECOLOGY

## 2021-05-07 PROCEDURE — 3008F BODY MASS INDEX DOCD: CPT | Performed by: OBSTETRICS & GYNECOLOGY

## 2021-05-07 NOTE — PROGRESS NOTES
Iud insertions    Date/Time: 5/7/2021 11:38 AM  Performed by: LEANNE Malone  Authorized by: LEANNE Malone   Universal Protocol:  Procedure performed by: Estuardo PERDOMO)  Consent: Verbal consent obtained  Written consent obtained  Risks and benefits: risks, benefits and alternatives were discussed  Consent given by: patient  Patient understanding: patient states understanding of the procedure being performed  Patient consent: the patient's understanding of the procedure matches consent given  Procedure consent: procedure consent matches procedure scheduled  Relevant documents: relevant documents present and verified  Patient identity confirmed: verbally with patient        Procedure:     Pelvic exam performed: yes      Negative GC/chlamydia test: sent today       Negative urine pregnancy test: yes      Cervix cleaned and prepped: yes      Speculum placed in vagina: yes      Tenaculum applied to cervix: yes      Uterus sounded: yes      Uterus sound depth (cm):  7    IUD inserted with no complications: yes      IUD type:  Mirena    Strings trimmed: yes    Post-procedure:     Patient tolerated procedure well: yes      Patient will follow up after next period: yes    Comments:      Medicated with Iburprofen  600 mg prior to procedure  Tolerated procedure well   No unusual bleeding noted, vaginal fornex swabbed with 3 procto swabs post procedure for light bleeding     Encounter for insertion of mirena IUD  Mirena IUD placed today

## 2021-05-07 NOTE — PATIENT INSTRUCTIONS
Intrauterine Device   DISCHARGE INSTRUCTIONS:   Seek care immediately if:   · You have severe pain or bleeding during your period  · You have a fever and severe abdominal pain  Call your doctor or gynecologist if:   · You think you are pregnant  · The IUD has come out  · You have bleeding from your vagina after you have sex, and it is not your period  · You have pain during sex  · You cannot feel the IUD string, the string feels longer, or you feel the plastic of the IUD itself  · You have vaginal discharge that is green, yellow, or has a foul odor  · You have questions or concerns about your condition or care  Medicines:   · NSAIDs  help decrease swelling and pain or fever  This medicine is available with or without a doctor's order  NSAIDs can cause stomach bleeding or kidney problems in certain people  If you take blood thinner medicine, always ask your healthcare provider if NSAIDs are safe for you  Always read the medicine label and follow directions  · Take your medicine as directed  Contact your healthcare provider if you think your medicine is not helping or if you have side effects  Tell him or her if you are allergic to any medicine  Keep a list of the medicines, vitamins, and herbs you take  Include the amounts, and when and why you take them  Bring the list or the pill bottles to follow-up visits  Carry your medicine list with you in case of an emergency  Self-care:   · Apply heat to relieve pain and cramping  Use a heating pad set on low  Apply heat to your lower abdomen for 20 minutes every hour, or as directed  · Return to activities as directed  Your healthcare provider will tell you when it is okay to return to work, school, or other activities  · Do not use a tampon or have sex  until your provider says it is okay  Make sure your IUD is in place: An IUD has a string that is made of plastic thread  One to 2 inches of this string hangs into your vagina  You cannot see this string, and it should not cause problems when you have sex  Check your IUD string every 3 days for the first 3 months that you have your IUD  After that, check the string after each monthly period  Do the following to check the placement of your IUD:  · Wash your hands with soap and warm water  Dry them with a clean towel  · Bend your knees and squat low to the ground  · Gently put your index finger inside your vagina  The cervix is at the top of the vagina and feels like the tip of your nose  Feel for the IUD string  Do not pull on the string  You should not be able to feel the firm plastic of the IUD itself  · Wash your hands after you check your IUD string  For more information:   · [facility] [address] , [address] [place]  Phone: 0- 534 - [phone]  Web Address: https://[employer]    Follow up with your healthcare provider as directed:  Write down your questions so you remember to ask them during your visits  © Copyright [employer] 2021 Information is for End User's use only and may not be sold, redistributed or otherwise used for commercial purposes  All illustrations and images included in CareNotes® are the copyrighted property of A D A M , Inc  or [facility] Dorita Ruiz   The above information is an  only  It is not intended as medical advice for individual conditions or treatments  Talk to your doctor, nurse or pharmacist before following any medical regimen to see if it is safe and effective for you

## 2021-05-07 NOTE — PROGRESS NOTES
Marie Grimes presents to office today for discussion about contraception  She is considering a tubal but open to further discussion of choices  Currently is using withdrawal    Had been exclusively breast feeing for past 20 months with return of menses approx 4-5 months ago  Pregnancy is not desired and would be devastating to her at this time  Sts she cant handle caring for another human in her life  After reviewing all options with Marie Grimes today she decided to have [address] IUD inserted  We were able to provide this service for her today  She was not sure she could come back any time soon due to  and other logistic issues  Procedure reviewed, consent obtained, neg in office pregnancy test,, placement competed with no complications       Pt very grateful that we could help her today

## 2021-05-08 LAB
C TRACH DNA SPEC QL NAA+PROBE: NEGATIVE
N GONORRHOEA DNA SPEC QL NAA+PROBE: NEGATIVE

## 2021-05-11 ENCOUNTER — TELEPHONE (OUTPATIENT)
Dept: OBGYN CLINIC | Facility: CLINIC | Age: 36
End: 2021-05-11

## 2021-06-18 ENCOUNTER — OFFICE VISIT (OUTPATIENT)
Dept: OBGYN CLINIC | Facility: CLINIC | Age: 36
End: 2021-06-18
Payer: COMMERCIAL

## 2021-06-18 VITALS — SYSTOLIC BLOOD PRESSURE: 110 MMHG | WEIGHT: 178 LBS | BODY MASS INDEX: 33.63 KG/M2 | DIASTOLIC BLOOD PRESSURE: 72 MMHG

## 2021-06-18 DIAGNOSIS — Z30.431 IUD CHECK UP: Primary | ICD-10-CM

## 2021-06-18 PROCEDURE — 99213 OFFICE O/P EST LOW 20 MIN: CPT | Performed by: OBSTETRICS & GYNECOLOGY

## 2021-06-18 NOTE — PATIENT INSTRUCTIONS
Prophylactic NSAID therapy for painful of heavy menses     Ibuprofen or Naproxen (chose 1 or the other, do not take both), Dose as noted on the box  Typically Ibuprofen dose is 600 mg, (3 tablets) every 6-8 hours  Typically Naproxen dose is 500 mg every 12 hours  Start taking medication 2 days prior to onset of menses and continue taking through the first 3 days of menses   Make sure you take consistently this is important  You need to take with food to decrease any gastrointestinal upset effects    This is proven therapy to reduce you flow and cramping by 50 %    Life style changes that have a positive effect on painful and heavy periods are as follows   Daily physical exercise    Increase fiber, fresh fruits and vegetables in your diet    Increase daily water intake    Heating pads(do not apply directly to skin, apply over clothing or towel)   Warm Baths   Relaxation techniques, meditation, massage, yoga and mindfulness     These are all suggestion for improving your sense of frustrations with your menstrual cycle and improving your overall wellness and lifestyle

## 2021-06-18 NOTE — PROGRESS NOTES
Assessment/Plan:    IUD check up  Stings present at cervical O's  Small amount of dark red bleeding noted     Advised Ibuprofen q 6-8 hours for the next 2-3 days        Diagnoses and all orders for this visit:    IUD check up    Other orders  -     levonorgestrel (MIRENA) 20 MCG/24HR IUD; 1 each by Intrauterine route once          Subjective:      Patient ID: Gutierrez Dempsey is a 37 y o  female  36 yo female presents for IUD follow up / string check , placed by me on 5/7/21  Reports that she is doing well, has resumed intercourse without any discomfort  Reports daily light bleeding since inserted, but is happy with IUD and is aware that is may take several months for the spotting/bleeding to resolve  The following portions of the patient's history were reviewed and updated as appropriate: allergies, current medications, past family history, past medical history, past social history, past surgical history and problem list     Review of Systems   Constitutional: Negative for chills, fatigue and fever  Eyes: Negative for visual disturbance  Respiratory: Negative for cough and shortness of breath  Cardiovascular: Negative for chest pain  Gastrointestinal: Negative for abdominal pain  Genitourinary: Negative for vaginal bleeding and vaginal discharge  Objective:      /72 (BP Location: Left arm, Patient Position: Sitting, Cuff Size: Large)   Wt 80 7 kg (178 lb)   BMI 33 63 kg/m²          Physical Exam  Vitals and nursing note reviewed  Constitutional:       Appearance: Normal appearance  She is normal weight  HENT:      Head: Normocephalic and atraumatic  Eyes:      Conjunctiva/sclera: Conjunctivae normal    Cardiovascular:      Rate and Rhythm: Normal rate  Pulmonary:      Effort: Pulmonary effort is normal    Genitourinary:     General: Normal vulva  Exam position: Lithotomy position        Dioni stage (genital): 5       Vagina: Normal       Cervix: Normal  Uterus: Normal        Adnexa: Right adnexa normal and left adnexa normal       Comments: IUD strings present at cervical O's  Musculoskeletal:         General: Normal range of motion  Cervical back: Normal range of motion  Lymphadenopathy:      Lower Body: No right inguinal adenopathy  No left inguinal adenopathy  Skin:     General: Skin is warm and dry  Neurological:      Mental Status: She is alert  Psychiatric:         Mood and Affect: Mood normal          Behavior: Behavior normal          Thought Content:  Thought content normal          Judgment: Judgment normal        Return to office for yearly exam or PRN

## 2021-06-18 NOTE — ASSESSMENT & PLAN NOTE
Stings present at cervical O's  Small amount of dark red bleeding noted     Advised Ibuprofen q 6-8 hours for the next 2-3 days

## 2021-10-05 ENCOUNTER — HOSPITAL ENCOUNTER (EMERGENCY)
Facility: HOSPITAL | Age: 36
Discharge: HOME | End: 2021-10-05
Attending: EMERGENCY MEDICINE | Admitting: EMERGENCY MEDICINE
Payer: COMMERCIAL

## 2021-10-05 VITALS — HEART RATE: 67 BPM | TEMPERATURE: 98.1 F | RESPIRATION RATE: 16 BRPM | OXYGEN SATURATION: 99 %

## 2021-10-05 DIAGNOSIS — K04.7 DENTAL INFECTION: Primary | ICD-10-CM

## 2021-10-05 PROCEDURE — 99282 EMERGENCY DEPT VISIT SF MDM: CPT

## 2021-10-05 PROCEDURE — 96372 THER/PROPH/DIAG INJ SC/IM: CPT

## 2021-10-05 PROCEDURE — 99284 EMERGENCY DEPT VISIT MOD MDM: CPT | Performed by: PHYSICIAN ASSISTANT

## 2021-10-05 RX ORDER — IBUPROFEN 800 MG/1
800 TABLET ORAL 3 TIMES DAILY
Qty: 21 TABLET | Refills: 0 | Status: SHIPPED | OUTPATIENT
Start: 2021-10-05 | End: 2022-01-28 | Stop reason: ALTCHOICE

## 2021-10-05 RX ORDER — CHLORHEXIDINE GLUCONATE 0.12 MG/ML
15 RINSE ORAL 2 TIMES DAILY
Qty: 120 ML | Refills: 0 | Status: SHIPPED | OUTPATIENT
Start: 2021-10-05 | End: 2022-01-28 | Stop reason: ALTCHOICE

## 2021-10-05 RX ORDER — HYDROCODONE BITARTRATE AND ACETAMINOPHEN 5; 325 MG/1; MG/1
1 TABLET ORAL ONCE
Status: COMPLETED | OUTPATIENT
Start: 2021-10-05 | End: 2021-10-05

## 2021-10-05 RX ORDER — HYDROCODONE BITARTRATE AND ACETAMINOPHEN 5; 325 MG/1; MG/1
1 TABLET ORAL EVERY 6 HOURS PRN
Qty: 10 TABLET | Refills: 0 | Status: SHIPPED | OUTPATIENT
Start: 2021-10-05 | End: 2021-10-15

## 2021-10-05 RX ORDER — KETOROLAC TROMETHAMINE 30 MG/ML
30 INJECTION, SOLUTION INTRAMUSCULAR; INTRAVENOUS ONCE
Status: COMPLETED | OUTPATIENT
Start: 2021-10-05 | End: 2021-10-05

## 2021-10-05 RX ADMIN — KETOROLAC TROMETHAMINE 30 MG: 30 INJECTION, SOLUTION INTRAMUSCULAR at 03:43

## 2021-10-05 RX ADMIN — HYDROCODONE BITARTRATE AND ACETAMINOPHEN 1 TABLET: 5; 325 TABLET ORAL at 03:43

## 2022-01-25 NOTE — PROGRESS NOTES
[facility] HEALTH MAINTENANCE OFFICE VISIT  [facility] [place]    NAME: Chico Boggs  AGE: 31 y o  SEX: female  : 1985     DATE: 2022    Assessment and Plan     1  Encounter for annual physical exam  -patient is up-to-date with preventative care screenings and immunizations  -return in 1 year for next physical exam and sooner as needed    2  Anxiety  -follows with psychiatry, stable at this time    3  Need for hepatitis C screening test    - Hepatitis C Antibody ([facility]); Future    4  Mixed hyperlipidemia    - Lipid Panel with Direct LDL reflex; Future  -follow up repeat lipid screen, last lipid panel reviewed  - increase fruits and vegetables    Component      Latest Ref Rng & Units 3/6/2018   Cholesterol      50 - 200 mg/dL 212 (H)   Triglycerides      <=150 mg/dL 66   HDL      40 - 60 mg/dL 65 (H)   LDL Calculated      0 - 100 mg/dL 134 (H)     · Last Pap smear with Co testing 2019 Negative for intraepithelial lesion or malignancy   · Patient Counseling:   · Nutrition: Stressed importance of a well balanced diet, moderation of sodium/saturated fat, caloric balance and sufficient intake of fiber  · Exercise: Stressed the importance of regular exercise with a goal of 150 minutes per week  · Dental Health: Discussed daily flossing and brushing and regular dental visits   · Injury Prevention: Discussed Safety Belts, Safety Helmets, and Smoke Detectors  · Sexual Health: Follows with [facility]   planning for vasectomy  · Immunizations reviewed:  Patient up-to-date with COVID-19 vaccination series and booster ([facility] [place]  [address]) and influenza vaccine ([facility])  · Discussed benefits of:  Cervical cancer screening up-to-date   BMI Counseling: Body mass index is 25 24 kg/m²  Discussed with patient's BMI with her  BMI Counseling: Body mass index is 25 24 kg/m²   The BMI is above normal  Nutrition recommendations include encouraging healthy choices of fruits and vegetables and reducing intake of cholesterol  Patient referred to PCP  Rationale for BMI follow-up plan is due to patient being overweight or obese  Chief Complaint     Chief Complaint   Patient presents with    Annual Exam       History of Present Illness     Well Adult Physical   Patient here for a comprehensive physical exam       Diet and Physical Activity  Diet: no pork, beef, or poultry  Consumes seafood  Patient is breastfeeding currently she states, she tries to hydrate well  Exercise: "active lifestyle"      Depression Screen  PHQ-2/9 Depression Screening    Little interest or pleasure in doing things: 1 - several days  Feeling down, depressed, or hopeless: 3 - nearly every day  Trouble falling or staying asleep, or sleeping too much: 1 - several days  Feeling tired or having little energy: 1 - several days  Poor appetite or overeatin - several days  Feeling bad about yourself - or that you are a failure or have let yourself or your family down: 0 - not at all  Trouble concentrating on things, such as reading the newspaper or watching television: 3 - nearly every day  Moving or speaking so slowly that other people could have noticed  Or the opposite - being so fidgety or restless that you have been moving around a lot more than usual: 0 - not at all  Thoughts that you would be better off dead, or of hurting yourself in some way: 0 - not at all  PHQ-9 Score: 10   PHQ-9 Interpretation: Moderate depression       Patient follows at [address] for Psychiatry        General Health  Hearing: Normal:  bilateral  Vision: wears glasses for driving  Dental: regular dental visits    Reproductive Health  No issues       The following portions of the patient's history were reviewed and updated as appropriate: allergies, current medications, past family history, past medical history, past social history, past surgical history and problem list     Review of Systems     Review of Systems   Constitutional: Negative for chills  HENT: Congestion: Sometimes with seasonal allergies  Eyes: Visual disturbance:  wears glasses while driving  Respiratory: Negative for shortness of breath  Cough:  sometimes with seasonal allergies  Cardiovascular: Negative for chest pain, palpitations and leg swelling  Gastrointestinal: Negative for anal bleeding, blood in stool, constipation, diarrhea, nausea and vomiting  Abdominal pain:  occasionally left upper quadrant  Skin:        Small skin bumps she mentions were ultrasound in the past, insurance did not cover excision she describes not extremely bothersome at the moment, have not changed in size   Allergic/Immunologic: Positive for food allergies  Neurological: Negative for headaches  Psychiatric/Behavioral: Negative for self-injury and suicidal ideas   Dysphoric mood: "winter blues"       Past Medical History     Past Medical History:   Diagnosis Date    Anxiety     Depression     Postpartum depression     Preeclampsia     hx with first pregnancy    Varicella        Past Surgical History     Past Surgical History:   Procedure Laterality Date    WISDOM TOOTH EXTRACTION         Social History     Social History     Socioeconomic History    Marital status: /Civil Union     Spouse name: None    Number of children: None    Years of education: None    Highest education level: None   Occupational History    None   Tobacco Use    Smoking status: Former Smoker     Quit date: 1/1/2019     Years since quitting: 3 0    Smokeless tobacco: Never Used   Vaping Use    Vaping Use: Every day   Substance and Sexual Activity    Alcohol use: No    Drug use: No    Sexual activity: Yes     Partners: Male     Birth control/protection: None     Comment: pull out    Other Topics Concern    None   Social History Narrative    None     Social Determinants of Health     Financial Resource Strain: Not on file   Food Insecurity: Not on file   Transportation Needs: Not on file   Physical Activity: Not on file   Stress: Not on file   Social Connections: Not on file   Intimate Partner Violence: Not on file   Housing Stability: Not on file       Family History     Family History   Problem Relation Age of Onset    Hyperlipidemia Mother     Hyperlipidemia Father     COPD Father     Vascular Disease Daughter     No Known Problems Daughter        Current Medications       Current Outpatient Medications:     ALPRAZolam (XANAX) 1 mg tablet,  , Disp: , Rfl:     FLUoxetine (PROzac) 20 mg capsule, 30 mg , Disp: , Rfl:      Allergies     Allergies   Allergen Reactions    Eggs Or Egg-Derived Products - Food Allergy      Egg whites       Objective     /70 (BP Location: Left arm, Patient Position: Sitting, Cuff Size: Adult)   Pulse 60   Ht 5' 1" (1 549 m)   Wt 60 6 kg (133 lb 9 6 oz)   SpO2 100%   BMI 25 24 kg/m²      Physical Exam  Constitutional:       Appearance: Normal appearance  She is well-developed  HENT:      Head: Normocephalic and atraumatic  Right Ear: External ear normal       Left Ear: External ear normal       Nose: Nose normal       Mouth/Throat:      Mouth: Mucous membranes are moist       Pharynx: Oropharynx is clear  Eyes:      Extraocular Movements: Extraocular movements intact  Conjunctiva/sclera: Conjunctivae normal       Pupils: Pupils are equal, round, and reactive to light  Cardiovascular:      Rate and Rhythm: Normal rate and regular rhythm  Pulses: Normal pulses  Heart sounds: Normal heart sounds  Pulmonary:      Effort: Pulmonary effort is normal       Breath sounds: Normal breath sounds  Abdominal:      General: Abdomen is flat  Bowel sounds are normal       Palpations: Abdomen is soft  There is no hepatomegaly or splenomegaly  Tenderness: There is no abdominal tenderness  There is no guarding or rebound     Genitourinary:     Vagina: Normal  Musculoskeletal:         General: Normal range of motion  Cervical back: Normal range of motion and neck supple  Right lower leg: No edema  Left lower leg: No edema  Skin:     General: Skin is warm  Capillary Refill: Capillary refill takes less than 2 seconds  Findings: No rash  Neurological:      Mental Status: She is alert and oriented to person, place, and time  Psychiatric:         Mood and Affect: Mood normal          Behavior: Behavior normal          Thought Content:  Thought content normal          Judgment: Judgment normal            No exam data present        Sissy Silvestre DO  [id number] [facility]

## 2022-01-28 ENCOUNTER — OFFICE VISIT (OUTPATIENT)
Dept: FAMILY MEDICINE CLINIC | Facility: MEDICAL CENTER | Age: 37
End: 2022-01-28
Payer: COMMERCIAL

## 2022-01-28 VITALS
SYSTOLIC BLOOD PRESSURE: 114 MMHG | OXYGEN SATURATION: 100 % | WEIGHT: 133.6 LBS | BODY MASS INDEX: 25.22 KG/M2 | HEIGHT: 61 IN | DIASTOLIC BLOOD PRESSURE: 70 MMHG | HEART RATE: 60 BPM

## 2022-01-28 DIAGNOSIS — E78.2 MIXED HYPERLIPIDEMIA: ICD-10-CM

## 2022-01-28 DIAGNOSIS — Z11.59 NEED FOR HEPATITIS C SCREENING TEST: ICD-10-CM

## 2022-01-28 DIAGNOSIS — Z00.00 ENCOUNTER FOR ANNUAL PHYSICAL EXAM: Primary | ICD-10-CM

## 2022-01-28 DIAGNOSIS — F41.9 ANXIETY: ICD-10-CM

## 2022-01-28 PROCEDURE — 99385 PREV VISIT NEW AGE 18-39: CPT | Performed by: STUDENT IN AN ORGANIZED HEALTH CARE EDUCATION/TRAINING PROGRAM

## 2022-01-28 NOTE — PATIENT INSTRUCTIONS
Heart Healthy Diet   WHAT YOU NEED TO KNOW:   A heart healthy diet is an eating plan low in unhealthy fats and sodium (salt)  The plan is high in healthy fats and fiber  A heart healthy diet helps improve your cholesterol levels and lowers your risk for heart disease and stroke  A dietitian will teach you how to read and understand food labels  DISCHARGE INSTRUCTIONS:   Heart healthy diet guidelines to follow:   · Choose foods that contain healthy fats  ? Unsaturated fats  include monounsaturated and polyunsaturated fats  Unsaturated fat is found in foods such as soybean, canola, olive, corn, and safflower oils  It is also found in soft tub margarine that is made with liquid vegetable oil  ? Omega-3 fat  is found in certain fish, such as salmon, tuna, and trout, and in walnuts and flaxseed  Eat fish high in omega-3 fats at least 2 times a week  · Get 20 to 30 grams of fiber each day  Fruits, vegetables, whole-grain foods, and legumes (cooked beans) are good sources of fiber  · Limit or do not have unhealthy fats  ? Cholesterol  is found in animal foods, such as eggs and lobster, and in dairy products made from whole milk  Limit cholesterol to less than 200 mg each day  ? Saturated fat  is found in meats, such as zavala and hamburger  It is also found in chicken or turkey skin, whole milk, and butter  Limit saturated fat to less than 7% of your total daily calories  ? Trans fat  is found in packaged foods, such as potato chips and cookies  It is also in hard margarine, some fried foods, and shortening  Do not eat foods that contain trans fats  · Limit sodium as directed  You may be told to limit sodium to 2,000 to 2,300 mg each day  Choose low-sodium or no-salt-added foods  Add little or no salt to food you prepare  Use herbs and spices in place of salt         Include the following in your heart healthy plan:  Ask your dietitian or healthcare provider how many servings to have from each of the following food groups:  · Grains:      ? Whole-wheat breads, cereals, and pastas, and brown rice    ? Low-fat, low-sodium crackers and chips    · Vegetables:      ? Broccoli, green beans, green peas, and spinach    ? Collards, kale, and lima beans    ? Carrots, sweet potatoes, tomatoes, and peppers    ? Canned vegetables with no salt added    · Fruits:      ? Bananas, peaches, pears, and pineapple    ? Grapes, raisins, and dates    ? Oranges, tangerines, grapefruit, orange juice, and grapefruit juice    ? Apricots, mangoes, melons, and papaya    ? Raspberries and strawberries    ? Canned fruit with no added sugar    · Low-fat dairy:      ? Nonfat (skim) milk, 1% milk, and low-fat almond, cashew, or soy milks fortified with calcium    ? Low-fat cheese, regular or frozen yogurt, and cottage cheese    · Meats and proteins:      ? Lean cuts of beef and pork (loin, leg, round), skinless chicken and turkey    ? Legumes, soy products, egg whites, or nuts    Limit or do not include the following in your heart healthy plan:   · Unhealthy fats and oils:      ? Whole or 2% milk, cream cheese, sour cream, or cheese    ? High-fat cuts of beef (T-bone steaks, ribs), chicken or turkey with skin, and organ meats such as liver    ? Butter, stick margarine, shortening, and cooking oils such as coconut or palm oil    · Foods and liquids high in sodium:      ? Packaged foods, such as frozen dinners, cookies, macaroni and cheese, and cereals with more than 300 mg of sodium per serving    ? Vegetables with added sodium, such as instant potatoes, vegetables with added sauces, or regular canned vegetables    ? Cured or smoked meats, such as hot dogs, zavala, and sausage    ? High-sodium ketchup, barbecue sauce, salad dressing, pickles, olives, soy sauce, or miso    · Foods and liquids high in sugar:      ? Candy, cake, cookies, pies, or doughnuts    ? Soft drinks (soda), sports drinks, or sweetened tea    ?  Canned or dry mixes for cakes, soups, sauces, or gravies    Other healthy heart guidelines:   · Do not smoke  Nicotine and other chemicals in cigarettes and cigars can cause lung and heart damage  Ask your healthcare provider for information if you currently smoke and need help to quit  E-cigarettes or smokeless tobacco still contain nicotine  Talk to your healthcare provider before you use these products  · Limit or do not drink alcohol as directed  Alcohol can damage your heart and raise your blood pressure  Your healthcare provider may give you specific daily and weekly limits  The general recommended limit is 1 drink a day for women 21 or older and for men 72 or older  Do not have more than 3 drinks in a day or 7 in a week  The recommended limit is 2 drinks a day for men 24to 59years of age  Do not have more than 4 drinks in a day or 14 in a week  A drink of alcohol is 12 ounces of beer, 5 ounces of wine, or 1½ ounces of liquor  · Exercise regularly  Exercise can help you maintain a healthy weight and improve your blood pressure and cholesterol levels  Regular exercise can also decrease your risk for heart problems  Ask your healthcare provider about the best exercise plan for you  Do not start an exercise program without asking your healthcare provider  Follow up with your doctor or cardiologist as directed:  Write down your questions so you remember to ask them during your visits  © Copyright [facility] 2021 Information is for End User's use only and may not be sold, redistributed or otherwise used for commercial purposes  All illustrations and images included in CareNotes® are the copyrighted property of A D A M , Inc  or [facility] Dorita Ruiz   The above information is an  only  It is not intended as medical advice for individual conditions or treatments  Talk to your doctor, nurse or pharmacist before following any medical regimen to see if it is safe and effective for you

## 2022-02-11 ENCOUNTER — APPOINTMENT (OUTPATIENT)
Dept: LAB | Facility: MEDICAL CENTER | Age: 37
End: 2022-02-11
Payer: COMMERCIAL

## 2022-02-11 DIAGNOSIS — Z11.59 NEED FOR HEPATITIS C SCREENING TEST: ICD-10-CM

## 2022-02-11 DIAGNOSIS — E78.2 MIXED HYPERLIPIDEMIA: ICD-10-CM

## 2022-02-11 LAB
CHOLEST SERPL-MCNC: 216 MG/DL
HCV AB SER QL: NORMAL
HDLC SERPL-MCNC: 64 MG/DL
LDLC SERPL CALC-MCNC: 142 MG/DL (ref 0–100)
TRIGL SERPL-MCNC: 50 MG/DL

## 2022-02-11 PROCEDURE — 36415 COLL VENOUS BLD VENIPUNCTURE: CPT

## 2022-02-11 PROCEDURE — 80061 LIPID PANEL: CPT

## 2022-02-11 PROCEDURE — 86803 HEPATITIS C AB TEST: CPT

## 2022-05-15 NOTE — TELEPHONE ENCOUNTER
"Daily progress note    Chief complaint  Doing same  No new complaints except occasional itching  Pleasantly confused  No nausea vomiting diarrhea or abdominal pain  Family at bedside    History of present illness  75-year-old white male who is well-known to our service with history of hypertension hyperlipidemia coronary artery disease chronic kidney disease and cirrhosis with ascites followed by nephrology gastroenterology presented to [facility] emergency room with mental status changes generalized weakness and recurrent falls.  Patient unable to give detailed history most of the history obtained from the wife and daughter who reported that he has been feeling lousy due to weakness and not making sense at times but no fever chills chest pain shortness of breath abdominal pain vomiting diarrhea.  Patient does have nausea fatigue tiredness poor appetite and increased distention.  Patient has been getting paracentesis and the last one was 5 weeks ago.  Patient work-up in ER revealed hepatorenal syndrome with acute hepatic encephalopathy admit for management.  Patient also found to have right brain mass consistent with meningioma with no history of meningioma in the past.     REVIEW OF SYSTEMS  Unable to obtain     PHYSICAL EXAM   Blood pressure 129/75, pulse 69, temperature 97.7 °F (36.5 °C), temperature source Oral, resp. rate 18, height 188 cm (74\"), weight 81.2 kg (179 lb), SpO2 97 %.    Constitutional:  Awake and alert and no respiratory distress  HEENT:  Unremarkable  Neck: Normal range of motion. Neck supple. No JVD present.   Cardiovascular: Normal rate, regular rhythm and normal heart sounds. No murmur heard.  Pulmonary/Chest: Effort normal and decreased breath sounds bilaterally  Abdominal: Soft.  Distended and mild tenderness bowel sounds positive  Musculoskeletal: Normal range of motion. No edema, tenderness or deformity.   Neurological:  Pleasantly confused but follow commands  Skin: Skin is " FYI: Pt  calls and says she feels like "crap"  Only symptoms she complains of is feeling achy and her chest burns  No cold symptoms present  She did stop smoking 3 days ago and wonders if that is the cause of her discomfort  Aware it may be a contributing factor  Offered several appointments but she declined because of other conflicting reasons  Advised hydrate and use Ibuprofen  She will call back if does not resolve  warm and dry. No rash noted. Pt. is not diaphoretic. No erythema.   Psychiatric: Mood, affect normal.  He is pleasant and cooperative.     LAB RESULTS  Lab Results (last 24 hours)     Procedure Component Value Units Date/Time    Body Fluid Culture - Body Fluid, Peritoneum [[id number]] Collected: 05/13/22 0925    Specimen: Body Fluid from Peritoneum Updated: 05/15/22 0700     Body Fluid Culture No growth at 2 days     Gram Stain Rare (1+) WBCs per low power field      No organisms seen    Comprehensive Metabolic Panel [[id number]]  (Abnormal) Collected: 05/15/22 0552    Specimen: Blood Updated: 05/15/22 0644     Glucose 128 mg/dL      BUN 29 mg/dL      Creatinine 1.75 mg/dL      Sodium 139 mmol/L      Potassium 3.7 mmol/L      Chloride 107 mmol/L      CO2 18.0 mmol/L      Calcium 9.0 mg/dL      Total Protein 5.9 g/dL      Albumin 3.70 g/dL      ALT (SGPT) 20 U/L      AST (SGOT) 44 U/L      Alkaline Phosphatase 194 U/L      Total Bilirubin 19.2 mg/dL      Globulin 2.2 gm/dL      A/G Ratio 1.7 g/dL      BUN/Creatinine Ratio 16.6     Anion Gap 14.0 mmol/L      eGFR 40.6 mL/min/1.73      Comment: National Kidney Foundation and American Society of Nephrology (ASN) Task Force recommended calculation based on the Chronic Kidney Disease Epidemiology Collaboration (CKD-EPI) equation refit without adjustment for race.       Narrative:      GFR Normal >60  Chronic Kidney Disease <60  Kidney Failure <15      CBC & Differential [[id number]]  (Abnormal) Collected: 05/15/22 0552    Specimen: Blood Updated: 05/15/22 0614    Narrative:      The following orders were created for panel order CBC & Differential.  Procedure                               Abnormality         Status                     ---------                               -----------         ------                     CBC Auto Differential[[id number]]        Abnormal            Final result                 Please view results for these tests on the individual orders.    CBC  Auto Differential [[id number]]  (Abnormal) Collected: 05/15/22 0552    Specimen: Blood Updated: 05/15/22 0614     WBC 10.48 10*3/mm3      RBC 2.87 10*6/mm3      Hemoglobin 10.4 g/dL      Hematocrit 29.8 %      .8 fL      MCH 36.2 pg      MCHC 34.9 g/dL      RDW 14.7 %      RDW-SD 56.5 fl      MPV 10.3 fL      Platelets 111 10*3/mm3      Neutrophil % 72.9 %      Lymphocyte % 8.8 %      Monocyte % 15.5 %      Eosinophil % 1.1 %      Basophil % 0.6 %      Immature Grans % 1.1 %      Neutrophils, Absolute 7.64 10*3/mm3      Lymphocytes, Absolute 0.92 10*3/mm3      Monocytes, Absolute 1.62 10*3/mm3      Eosinophils, Absolute 0.12 10*3/mm3      Basophils, Absolute 0.06 10*3/mm3      Immature Grans, Absolute 0.12 10*3/mm3      nRBC 0.0 /100 WBC         Imaging Results (Last 24 Hours)     ** No results found for the last 24 hours. **        ECG 12 Lead  Component   Ref Range & Units 1 d ago    QT Interval   ms 444    Resulting Agency  ECG             HEART RATE= 69  bpm  RR Interval= 892  ms  AK Interval= 164  ms  P Horizontal Axis= 4  deg  P Front Axis= 29  deg  QRSD Interval= 107  ms  QT Interval= 444  ms  QRS Axis= -30  deg  T Wave Axis= 111  deg  - BORDERLINE ECG -  Sinus rhythm  Ventricular premature complex  Left axis deviation  No change from previous             Current Facility-Administered Medications:   •  acetaminophen (TYLENOL) tablet 650 mg, 650 mg, Oral, Q4H PRN, Memo Varela MD, 650 mg at 05/15/22 0409  •  carbidopa-levodopa (SINEMET)  MG per tablet 1 tablet, 1 tablet, Oral, TID, Memo Varela MD, 1 tablet at 05/15/22 1221  •  cefTRIAXone (ROCEPHIN) 1 g in sodium chloride 0.9 % 100 mL IVPB-VTB, 1 g, Intravenous, Q24H, Memo Varela MD, Last Rate: 200 mL/hr at 05/14/22 1501, 1 g at 05/14/22 1501  •  lactulose (CHRONULAC) 10 GM/15ML solution 30 g, 30 g, Oral, TID, Memo Varela MD, 30 g at 05/15/22 1221  •  lidocaine (XYLOCAINE) 1 % injection 10 mL, 10 mL, Injection, Once, Franky Voss,  MD  •  midodrine (PROAMATINE) tablet 5 mg, 5 mg, Oral, TID AC, Franky Moreau MD, 5 mg at 05/15/22 0618  •  morphine injection 2 mg, 2 mg, Intravenous, Q4H PRN, Keith Varela MD, 2 mg at 05/15/22 0433  •  ondansetron (ZOFRAN) injection 4 mg, 4 mg, Intravenous, Q6H PRN, Keith Varela MD, 4 mg at 05/15/22 0409  •  pantoprazole (PROTONIX) EC tablet 40 mg, 40 mg, Oral, BID AC, Keith Varela MD, 40 mg at 05/15/22 0618  •  riFAXIMin (XIFAXAN) tablet 550 mg, 550 mg, Oral, Q12H, Ning Tafoya, APRN, 550 mg at 05/15/22 1222  •  sodium bicarbonate tablet 650 mg, 650 mg, Oral, 4x Daily, Franky Moreau MD, 650 mg at 05/15/22 1222  •  sodium chloride 0.9 % flush 10 mL, 10 mL, Intravenous, PRN, Jovanni Guerra MD    ASSESSMENT  Hepatorenal syndrome  Cirrhosis with ascites s/p paracentesis  Jaundice  Hepatic encephalopathy  Acute kidney injury  Acute UTI  Chronic kidney disease stage III  Brain tumor consistent with meningioma  Hypotension  Hyperlipidemia  Coronary artery disease  Chronic anemia  Parkinson's disease  Esophageal varices and colon polyps  Gastroesophageal reflux disease    PLAN  CPM  Midodrine  Continue antibiotics  Repeat CT abdomen pelvis pending  Continue lactulose Protonix rifaximin Proamatine and Sinemet  Stress ulcer DVT prophylaxis  GI and nephrology to follow patient  Adjust home medications  Supportive care  PT/OT  DNR and poor prognosis  Discussed with family and nursing staff w  Discharge planning    KEITH VARELA MD

## 2022-08-09 NOTE — DISCHARGE SUMMARY
Discharge Summary - OB/GYN   Marcos Martino 30 y o  female MRN: [id number]  Unit/Bed#: [facility] 874-01 Encounter: [id number]      Admission Date: 2019     Discharge Date: 2019    Admitting Diagnosis:   1  Pregnancy at 36w6d  2  Depression and anxiety     Discharge Diagnosis:   Same, delivered    Procedures: spontaneous vaginal delivery    Admitting and Delivery Attending: Jerzy Doyle MD   Discharge Attending: [facility] Course:     Ms Marcos Martino is a 30 y o  [id number] at [address]  She presented to labor and delivery with , premature rupture of membranes  She was started on a pitocin titration and received an epidural for pain management  She progressed to complete cervical dilation  Prior to being complete, patient's FHT showed a prolonged deceleration  She was repositioned to all fours, given oxygen, a fluid bolus, and pitocin was stopped  The baseline recovered and patient was placed on her back to begin pushing  She delivered a viable male  on 19 at 2239  Weight 6lbs 3oz via spontaneous vaginal delivery  Apgars were 8 (1 min) and 9 (5 min)   stayed in the room after birth, but was eventually transferred to the NICU  Patient tolerated the procedure well and was transferred to recovery in stable condition  Her post-partum course was uncomplicated  Her post-partum pain was well controlled with oral analgesics  On day of discharge, she was ambulating and able to reasonably perform all ADLs  She was voiding and had appropriate bowel function  Pain was well controlled  She was discharged home on post-partum day #2 without complications  Patient was instructed to follow up with her OB as an outpatient and was given appropriate warnings to call provider if she develops signs of infection or uncontrolled pain      Complications: none apparent    Condition at discharge: good     Discharge instructions/Information to patient and family:   See after visit summary for information provided to patient and family  Provisions for Follow-Up Care:  See after visit summary for information related to follow-up care and any pertinent home health orders  Disposition: Home    Planned Readmission: No    Discharge Medications: For a complete list of the patient's medications, please refer to her med rec  No

## 2022-10-12 PROBLEM — Z11.3 SCREENING FOR STDS (SEXUALLY TRANSMITTED DISEASES): Status: RESOLVED | Noted: 2021-05-07 | Resolved: 2022-10-12

## 2023-01-30 ENCOUNTER — APPOINTMENT (OUTPATIENT)
Dept: LAB | Facility: MEDICAL CENTER | Age: 38
End: 2023-01-30

## 2023-01-30 DIAGNOSIS — E78.2 MIXED HYPERLIPIDEMIA: ICD-10-CM

## 2023-01-30 LAB
CHOLEST SERPL-MCNC: 202 MG/DL
HDLC SERPL-MCNC: 66 MG/DL
LDLC SERPL CALC-MCNC: 120 MG/DL (ref 0–100)
TRIGL SERPL-MCNC: 80 MG/DL

## 2023-09-29 ENCOUNTER — OFFICE VISIT (OUTPATIENT)
Dept: FAMILY MEDICINE CLINIC | Facility: MEDICAL CENTER | Age: 38
End: 2023-09-29
Payer: COMMERCIAL

## 2023-09-29 VITALS
OXYGEN SATURATION: 99 % | DIASTOLIC BLOOD PRESSURE: 68 MMHG | WEIGHT: 138.4 LBS | TEMPERATURE: 98.1 F | BODY MASS INDEX: 26.13 KG/M2 | HEART RATE: 70 BPM | SYSTOLIC BLOOD PRESSURE: 112 MMHG | HEIGHT: 61 IN

## 2023-09-29 DIAGNOSIS — Z83.719 FAMILY HISTORY OF COLONIC POLYPS: ICD-10-CM

## 2023-09-29 DIAGNOSIS — R09.81 NASAL CONGESTION: ICD-10-CM

## 2023-09-29 DIAGNOSIS — M54.50 CHRONIC LEFT-SIDED LOW BACK PAIN WITHOUT SCIATICA: ICD-10-CM

## 2023-09-29 DIAGNOSIS — Z00.00 ANNUAL PHYSICAL EXAM: Primary | ICD-10-CM

## 2023-09-29 DIAGNOSIS — Z80.0 FAMILY HISTORY OF COLON CANCER: ICD-10-CM

## 2023-09-29 DIAGNOSIS — G89.29 CHRONIC LEFT-SIDED LOW BACK PAIN WITHOUT SCIATICA: ICD-10-CM

## 2023-09-29 DIAGNOSIS — R09.89 SYMPTOMS OF UPPER RESPIRATORY INFECTION (URI): ICD-10-CM

## 2023-09-29 PROCEDURE — 99213 OFFICE O/P EST LOW 20 MIN: CPT | Performed by: STUDENT IN AN ORGANIZED HEALTH CARE EDUCATION/TRAINING PROGRAM

## 2023-09-29 PROCEDURE — 99395 PREV VISIT EST AGE 18-39: CPT | Performed by: STUDENT IN AN ORGANIZED HEALTH CARE EDUCATION/TRAINING PROGRAM

## 2023-09-29 RX ORDER — CLONIDINE HYDROCHLORIDE 0.1 MG/1
TABLET ORAL
COMMUNITY
Start: 2023-09-29

## 2023-09-29 RX ORDER — FLUOXETINE 20 MG/1
20 TABLET ORAL DAILY
COMMUNITY

## 2023-09-29 RX ORDER — FLUOXETINE 10 MG/1
CAPSULE ORAL
COMMUNITY
Start: 2023-09-29

## 2023-09-29 RX ORDER — FLUTICASONE PROPIONATE 50 MCG
1 SPRAY, SUSPENSION (ML) NASAL DAILY
Qty: 18.2 ML | Refills: 0 | Status: SHIPPED | OUTPATIENT
Start: 2023-09-29

## 2023-09-29 RX ORDER — FLUOXETINE 10 MG/1
30 CAPSULE ORAL DAILY
COMMUNITY

## 2023-09-29 NOTE — PROGRESS NOTES
[facility] HEALTH MAINTENANCE OFFICE VISIT  [facility] - [facility]    NAME: Ziyad Garcia  AGE: 41 y.o. SEX: female  : 1985     DATE: 2023    Assessment and Plan     1. Annual physical exam    2. Family history of colonic polyps  -     Ambulatory Referral to Gastroenterology; Future    3. Family history of colon cancer  -     Ambulatory Referral to Gastroenterology; Future    4. Chronic left-sided low back pain without sciatica  Assessment & Plan:  · No sciatica symptoms  · No lower extremity numbness or weakness  · Patient would benefit from course of physical therapy  · Follow-up if symptoms not improving    Orders:  -     Ambulatory Referral to Physical Therapy; Future    5. Nasal congestion  -     fluticasone (FLONASE) 50 mcg/act nasal spray; 1 spray into each nostril daily    6. Symptoms of upper respiratory infection (URI)  Assessment & Plan:  · Vital signs stable  · No respiratory distress, lungs clear to auscultation bilaterally  · Continue fluids, rest, supportive care measures  · Start Flonase  · Call if new or worsening symptoms or if symptoms not improving      · Patient Counseling:   · Nutrition: Stressed importance of a well balanced diet, moderation of sodium/saturated fat, caloric balance and sufficient intake of fiber  · Exercise: Stressed the importance of regular exercise with a goal of 150 minutes per week  · Dental Health: Discussed daily flossing and brushing and regular dental visits   · Advised about sunscreen use  · Immunizations reviewed:   · Patient with cold symptoms today. She will return to office in 1 to 2 weeks for nurse visit for influenza vaccine. · Tetanus booster up-to-date. · Patient will upload [id number] vaccination card to [employer] for review. · Discussed newest COVID-19 vaccine.   · Discussed benefits of:   · Patient reminded to schedule appointment with her gynecologist, due for Pap smear 2024  · No family history of breast or ovarian cancer  · She reports an extensive family history of colonic polyps on maternal side, maternal grandmother with history of colon cancer  • BMI Counseling: Body mass index is 26.15 kg/m². BMI Counseling: Body mass index is 26.15 kg/m². The BMI is above normal. Nutrition recommendations include encouraging healthy choices of fruits and vegetables. Exercise recommendations include exercising 3-5 times per week. Rationale for BMI follow-up plan is due to patient being overweight or obese. Follow-up in 1 year for next annual physical and sooner as needed. Chief Complaint     Chief Complaint   Patient presents with   • Annual Exam       History of Present Illness     HPI    Well Adult Physical   Patient here for a comprehensive physical exam.  Patient does have complaint today of left lower back discomfort. She denies any numbness, tingling, weakness in lower extremities. No urinary incontinence, no saddle anesthesia. Diet and Physical Activity  Diet: Patient states that she has improved as far as vegetable intake  Exercise: weekly climbing gym      Depression Screen  PHQ-2/9 Depression Screening    Little interest or pleasure in doing things: 0 - not at all  Feeling down, depressed, or hopeless: 0 - not at all  Trouble falling or staying asleep, or sleeping too much: 0 - not at all  Feeling tired or having little energy: 0 - not at all  Poor appetite or overeatin - not at all  Feeling bad about yourself - or that you are a failure or have let yourself or your family down: 0 - not at all  Trouble concentrating on things, such as reading the newspaper or watching television: 0 - not at all  Moving or speaking so slowly that other people could have noticed.  Or the opposite - being so fidgety or restless that you have been moving around a lot more than usual: 0 - not at all  Thoughts that you would be better off dead, or of hurting yourself in some way: 0 - not at all  PHQ-9 Score: 0   PHQ-9 Interpretation: No or Minimal depression           General Health  Hearing: Normal:  bilateral  Vision: no vision problems  Dental: regular dental visits, brushes teeth twice daily and flosses teeth occasionally    Reproductive Health  - advised to follow up with her gynecologist       The following portions of the patient's history were reviewed and updated as appropriate: allergies, current medications, past family history, past medical history, past social history, past surgical history and problem list.    Review of Systems     Review of Systems     As noted in HPI     Past Medical History     Past Medical History:   Diagnosis Date   • Allergic    • Anxiety    • Depression    • Postpartum depression    • Preeclampsia     hx with first pregnancy   • Varicella        Past Surgical History     Past Surgical History:   Procedure Laterality Date   • WISDOM TOOTH EXTRACTION         Social History     Social History     Socioeconomic History   • Marital status: /Civil Union     Spouse name: None   • Number of children: None   • Years of education: None   • Highest education level: None   Occupational History   • None   Tobacco Use   • Smoking status: Former     Years: 17.00     Types: Cigarettes     Quit date: 2019     Years since quittin.7   • Smokeless tobacco: Never   Vaping Use   • Vaping Use: Every day   Substance and Sexual Activity   • Alcohol use: No   • Drug use: No   • Sexual activity: Yes     Partners: Male     Birth control/protection: Male Sterilization     Comment: pull out    Other Topics Concern   • None   Social History Narrative   • None     Social Determinants of Health     Financial Resource Strain: Not on file   Food Insecurity: Not on file   Transportation Needs: Not on file   Physical Activity: Not on file   Stress: Not on file   Social Connections: Not on file   Intimate Partner Violence: Not on file   Housing Stability: Not on file       Family History Family History   Problem Relation Age of Onset   • Hyperlipidemia Mother    • Hyperlipidemia Father    • COPD Father    • Vascular Disease Daughter    • No Known Problems Daughter        Current Medications       Current Outpatient Medications:   •  ALPRAZolam (XANAX) 1 mg tablet,  , Disp: , Rfl:   •  cloNIDine (CATAPRES) 0.1 mg tablet, , Disp: , Rfl:   •  FLUoxetine (PROzac) 10 mg capsule, Take 30 mg by mouth daily, Disp: , Rfl:   •  FLUoxetine (PROzac) 20 mg capsule, 30 mg , Disp: , Rfl:   •  fluticasone (FLONASE) 50 mcg/act nasal spray, 1 spray into each nostril daily, Disp: 18.2 mL, Rfl: 0  •  FLUoxetine (PROzac) 10 mg capsule, , Disp: , Rfl:   •  Fluoxetine HCl, PMDD, 20 MG TABS, Take 20 mg by mouth daily, Disp: , Rfl:      Allergies     Allergies   Allergen Reactions   • Eggs Or Egg-Derived Products - Food Allergy Swelling     Egg whites       Objective     /68 (BP Location: Left arm, Patient Position: Sitting, Cuff Size: Adult)   Pulse 70   Temp 98.1 °F (36.7 °C)   Ht 5' 1" (1.549 m)   Wt 62.8 kg (138 lb 6.4 oz)   LMP 09/18/2023   SpO2 99%   BMI 26.15 kg/m²      Physical Exam  Vitals reviewed. Constitutional:       General: She is not in acute distress. Appearance: She is not toxic-appearing. HENT:      Head: Normocephalic and atraumatic. Right Ear: External ear normal.      Left Ear: External ear normal.      Nose: Congestion present. Mouth/Throat:      Mouth: Mucous membranes are moist.      Pharynx: Oropharynx is clear. No oropharyngeal exudate or posterior oropharyngeal erythema. Eyes:      General:         Right eye: No discharge. Left eye: No discharge. Extraocular Movements: Extraocular movements intact. Conjunctiva/sclera: Conjunctivae normal.      Pupils: Pupils are equal, round, and reactive to light. Cardiovascular:      Rate and Rhythm: Normal rate and regular rhythm. Pulses: Normal pulses. Heart sounds: Normal heart sounds. Pulmonary:      Effort: Pulmonary effort is normal. No respiratory distress. Breath sounds: Normal breath sounds. No wheezing or rales. Abdominal:      General: Abdomen is flat. Bowel sounds are normal.      Palpations: Abdomen is soft. Tenderness: There is no abdominal tenderness. Musculoskeletal:      Cervical back: Neck supple. Lumbar back: No swelling, edema, deformity, signs of trauma, lacerations, spasms, tenderness or bony tenderness. Decreased range of motion: with sidebending to right  Negative right straight leg raise test and negative left straight leg raise test.      Right lower leg: No edema. Left lower leg: No edema. Lymphadenopathy:      Cervical: No cervical adenopathy. Skin:     General: Skin is warm and dry. Neurological:      Mental Status: She is alert and oriented to person, place, and time. Psychiatric:         Mood and Affect: Mood normal.         Behavior: Behavior normal.         Thought Content:  Thought content normal.         Judgment: Judgment normal.               Isa Gaspar DO  [facility]

## 2023-09-29 NOTE — ASSESSMENT & PLAN NOTE
· No sciatica symptoms  · No lower extremity numbness or weakness  · Patient would benefit from course of physical therapy  · Follow-up if symptoms not improving

## 2023-09-29 NOTE — ASSESSMENT & PLAN NOTE
· Vital signs stable  · No respiratory distress, lungs clear to auscultation bilaterally  · Continue fluids, rest, supportive care measures  · Start Flonase  · Call if new or worsening symptoms or if symptoms not improving

## 2023-11-08 ENCOUNTER — TELEPHONE (OUTPATIENT)
Age: 38
End: 2023-11-08

## 2023-11-08 NOTE — TELEPHONE ENCOUNTER
Scheduled date of colonoscopy (as of today): SATURDAY 4/20/2024  Physician performing colonoscopy: DR. CAVAZOS  Location of colonoscopy: [place]   Bowel prep reviewed with patient: Penny Grewal / Jenn Kiran

## 2023-11-08 NOTE — TELEPHONE ENCOUNTER
11/08/23  Screened by: Quinn Guerrero    Referring Provider Dr. Torres Aguilar    Pre- Screening: There is no height or weight on file to calculate BMI. Has patient been referred for a routine screening Colonoscopy? yes  Is the patient between 43-73 years old? no      Previous Colonoscopy no   If yes:    Date:     Facility:     Reason:       SCHEDULING STAFF: If the patient is between 45yrs-49yrs, please advise patient to confirm benefits/coverage with their insurance company for a routine screening colonoscopy, some insurance carriers will only cover at [address] [place] [address] or older. If the patient is over 66years old, please schedule an office visit. Does the patient want to see a Gastroenterologist prior to their procedure OR are they having any GI symptoms? no    Has the patient been hospitalized or had abdominal surgery in the past 6 months? no    Does the patient use supplemental oxygen? no    Does the patient take Coumadin, Lovenox, Plavix, Elliquis, Xarelto, or other blood thinning medication? no    Has the patient had a stroke, cardiac event, or stent placed in the past year? no    Failed OA    SCHEDULING STAFF: If patient answers NO to above questions, then schedule procedure. If patient answers YES to above questions, then schedule office appointment. If patient is between 45yrs - 49yrs, please advise patient that we will have to confirm benefits & coverage with their insurance company for a routine screening colonoscopy.

## 2023-12-07 ENCOUNTER — OFFICE VISIT (OUTPATIENT)
Dept: OBGYN CLINIC | Facility: CLINIC | Age: 38
End: 2023-12-07
Payer: COMMERCIAL

## 2023-12-07 VITALS
SYSTOLIC BLOOD PRESSURE: 110 MMHG | DIASTOLIC BLOOD PRESSURE: 82 MMHG | WEIGHT: 136 LBS | HEIGHT: 61 IN | BODY MASS INDEX: 25.68 KG/M2

## 2023-12-07 DIAGNOSIS — Z32.02 PREGNANCY TEST NEGATIVE: ICD-10-CM

## 2023-12-07 DIAGNOSIS — R30.0 BURNING WITH URINATION: ICD-10-CM

## 2023-12-07 DIAGNOSIS — N92.0 MENORRHAGIA WITH REGULAR CYCLE: Primary | ICD-10-CM

## 2023-12-07 DIAGNOSIS — N94.6 DYSMENORRHEA: ICD-10-CM

## 2023-12-07 PROBLEM — Z30.430 ENCOUNTER FOR INSERTION OF MIRENA IUD: Status: RESOLVED | Noted: 2021-05-07 | Resolved: 2023-12-07

## 2023-12-07 PROBLEM — Z30.431 IUD CHECK UP: Status: RESOLVED | Noted: 2021-06-18 | Resolved: 2023-12-07

## 2023-12-07 LAB
BACTERIA UR QL AUTO: NORMAL /HPF
BILIRUB UR QL STRIP: NEGATIVE
CLARITY UR: CLEAR
COLOR UR: COLORLESS
GLUCOSE UR STRIP-MCNC: NEGATIVE MG/DL
HGB UR QL STRIP.AUTO: NEGATIVE
KETONES UR STRIP-MCNC: NEGATIVE MG/DL
LEUKOCYTE ESTERASE UR QL STRIP: NEGATIVE
NITRITE UR QL STRIP: NEGATIVE
NON-SQ EPI CELLS URNS QL MICRO: NORMAL /HPF
PH UR STRIP.AUTO: 7 [PH]
PROT UR STRIP-MCNC: NEGATIVE MG/DL
RBC #/AREA URNS AUTO: NORMAL /HPF
SL AMB  POCT GLUCOSE, UA: NORMAL
SL AMB LEUKOCYTE ESTERASE,UA: NORMAL
SL AMB POCT BILIRUBIN,UA: NORMAL
SL AMB POCT BLOOD,UA: NORMAL
SL AMB POCT CLARITY,UA: NORMAL
SL AMB POCT COLOR,UA: NORMAL
SL AMB POCT KETONES,UA: NORMAL
SL AMB POCT NITRITE,UA: NORMAL
SL AMB POCT PH,UA: NORMAL
SL AMB POCT SPECIFIC GRAVITY,UA: NORMAL
SL AMB POCT URINE HCG: NEGATIVE
SL AMB POCT URINE PROTEIN: NORMAL
SL AMB POCT UROBILINOGEN: NORMAL
SP GR UR STRIP.AUTO: 1 (ref 1–1.03)
UROBILINOGEN UR STRIP-ACNC: <2 MG/DL
WBC #/AREA URNS AUTO: NORMAL /HPF

## 2023-12-07 PROCEDURE — 99213 OFFICE O/P EST LOW 20 MIN: CPT

## 2023-12-07 PROCEDURE — 81025 URINE PREGNANCY TEST: CPT

## 2023-12-07 PROCEDURE — 87086 URINE CULTURE/COLONY COUNT: CPT

## 2023-12-07 PROCEDURE — 81001 URINALYSIS AUTO W/SCOPE: CPT

## 2023-12-07 PROCEDURE — 81002 URINALYSIS NONAUTO W/O SCOPE: CPT

## 2023-12-07 NOTE — PATIENT INSTRUCTIONS
Prophylactic NSAID therapy for Painful or Heavy menses     Ibuprofen or Naproxen (chose 1 or the other, do not take both), Dose as noted on the box. Typically Ibuprofen dose is 600 mg, (3 tablets) every 6-8 hours. Typically Naproxen dose is 500 mg every 12 hours. Start taking medication 2 days prior to onset of menses and continue taking through the first 3 days of menses. Make sure you take consistently this is important  You need to take with food to decrease any gastrointestinal upset effects    This is proven therapy to reduce you flow and cramping by 50 %    Life style changes that have a positive effect on painful and heavy periods are as follows   Daily physical exercise    Increase fiber, fresh fruits and vegetables in your diet    Increase daily water intake    Heating pads(do not apply directly to skin, apply over clothing or towel)   Warm Baths   Relaxation techniques, meditation, massage, yoga and mindfulness     These are all suggestion for improving your sense of frustrations with your menstrual cycle and improving your overall wellness and lifestyle     Dysmenorrhea   WHAT YOU NEED TO KNOW:   What is dysmenorrhea? Dysmenorrhea is painful menstrual cramps at or around the time of your monthly period. What causes dysmenorrhea? Your body normally produces chemicals each month to help your uterus contract. When too many of these chemicals are made, your uterus contracts too much and causes pain. Dysmenorrhea may also be caused by any of the following:  Abnormal structure of your uterus or vagina    A narrow cervix    Growth in or on your uterus or ovaries    Medical conditions, such as pelvic inflammatory disease, endometriosis, or uterine fibroids    A copper intrauterine device (IUD)    What increases my risk for dysmenorrhea?    Never been pregnant    Obesity    Smoking    Family history of painful menstrual cramps    Pelvic infection    Longer monthly period cycle    Medical conditions, such as a sexually transmitted infection or endometriosis    What are the signs and symptoms of dysmenorrhea? Mild to severe pain    Cramping pain in lower abdomen or low back    Bloating    Headache    Diarrhea    How is dysmenorrhea diagnosed? Your healthcare provider can usually diagnose dysmenorrhea by your signs and symptoms. Tell him or her when your symptoms started and if you have pain between your monthly periods. He or she may ask if anything relieves your pain, such as heat or medicine. Tell your provider if you are sexually active or have ever been pregnant. You may need any of the following:  A blood test  will check for pregnancy. A pelvic exam  may be needed to check the size and shape of your uterus and ovaries. Your healthcare provider gently inserts a warmed speculum into your vagina. A speculum is a tool that opens your vagina to show your cervix. A cervical culture  may be needed to check for infection. Your healthcare provider will use a swab to collect a sample of cells from your cervix. This will be sent to a lab for tests. An ultrasound  will show abnormal structure of your reproductive organs. Sound waves are used to show pictures on a monitor. How is dysmenorrhea treated? Dysmenorrhea can be controlled with lifestyle changes and medicines. It usually improves with age and pregnancy. Medicines:      NSAIDs  help decrease swelling and pain or fever. This medicine is available with or without a doctor's order. NSAIDs can cause stomach bleeding or kidney problems in certain people. If you take blood thinner medicine, always ask your healthcare provider if NSAIDs are safe for you. Always read the medicine label and follow directions. Birth control medicine  may help decrease your pain. This medicine may be birth control pills or an IUD that does not contain copper. Transcutaneous electric nerve stimulation  (TENS), is a device used to stimulate your nerves and decrease pain. Ask your healthcare provider for more information about TENS. How can I manage my symptoms? Eat low-fat foods. Increase the amount of vegetables and raw seeds you eat. Ask your healthcare provider if you should take vitamin B or magnesium supplements. These will help decrease your pain. Do not eat dairy products or eggs. Apply heat  on your lower abdomen for 20 to 30 minutes every 2 hours for as many days as directed. Heat helps decrease pain and muscle spasms. Manage your stress. Stress can make your symptoms worse. Try relaxation exercises, such as deep breathing. Exercise regularly. Ask your healthcare provider about the best exercise plan for you. Exercise can help decrease pain. Keep a record of your pain. Write down when your pain and periods start and stop. Bring the record with you to your follow-up visits. Do not smoke. Avoid others who smoke. If you smoke, it is never too late to quit. Smoking can increase your risk for dysmenorrhea. Ask your healthcare provider for information if you need help quitting. When should I contact my healthcare provider? You have anxiety or feel depressed. Your periods are early, late, or more painful than usual.    You have questions or concerns about your condition or care. When should I seek immediate care or call 911? You have severe pain. You have heavy vaginal bleeding and you feel faint. You have sudden chest pain and trouble breathing. CARE AGREEMENT:   You have the right to help plan your care. Learn about your health condition and how it may be treated. Discuss treatment options with your healthcare providers to decide what care you want to receive. You always have the right to refuse treatment. The above information is an  only. It is not intended as medical advice for individual conditions or treatments.  Talk to your doctor, nurse or pharmacist before following any medical regimen to see if it is safe and effective for you. © Copyright [employer]let[employer] Gosselin 2023 Information is for End User's use only and may not be sold, redistributed or otherwise used for commercial purposes.

## 2023-12-07 NOTE — PROGRESS NOTES
Assessment/Plan:    Dysmenorrhea  -discussed normal variation of menstrual cycles. -UPT negative. Plan for TVUS to r/o other etiologies. No GI symptoms. UA/UC ordered for dysuria. -reviewed management of dysmenorrhea and ovulatory pain. Patient states she will likely remove anything that is implanted (IUD and Nexplanon). States she is a different person every day she wakes up and her mental state can be very unpredictable. H/o mental health problems, anxiety and depression. Concerned about BC potentially affecting her moods in a negative way. Advised patient of potential SE, risks and benefits of BC. She does not feel the benefit outweighs the risk of affecting her moods at this time. -recommend she continue tylenol and ibuprofen as needed for dysmenorrhea. Diagnoses and all orders for this visit:    Menorrhagia with regular cycle  -     US pelvis complete w transvaginal; Future    Dysmenorrhea    Burning with urination  -     POCT urine dip  -     Urine culture; Future  -     Urinalysis with microscopic; Future  -     Urine culture  -     Urinalysis with microscopic    Pregnancy test negative  -     POCT urine HCG          Subjective:      Patient ID: Idania Bazzi is a 41 y.o. female here for abnormal menstrual cycle. Reports her LMP was 11/07/2023 and was heavier than usual. Her menses lasted 11 days then eventually tapered off. Had cramping throughout her period that continued for 3 weeks after her period ended. Cramps stopped a few days ago. Patient reports heavy and painful periods are not uncommon for her but she has never had cramping last for this long. She typically has painful ovulation. Typically takes tylenol for her pain with good relief. Denies any vaginal concerns, abdominal pain, nausea, vomiting, or diarrhea. She reports some discomfort with urination but denies urgency or frequency. Sexually active, sometimes has discomfort with intercourse. Her  had a vasectomy.  She had a Mirena IUD that she took out herself last year. Patient thought it was causing problems and had anxiety over it having it in place but now thinks it may have been in her head. The following portions of the patient's history were reviewed and updated as appropriate: She  has a past medical history of Allergic, Anxiety, Depression, Opioid abuse, in remission ([address]) ([id number]), Postpartum depression, Preeclampsia, and Varicella. She   Patient Active Problem List    Diagnosis Date Noted    Dysmenorrhea 12/07/2023    Symptoms of upper respiratory infection (URI) 09/29/2023    Chronic left-sided low back pain without sciatica 09/29/2023    Anxiety 02/06/2020    Mass of leg, right 04/09/2018    Mixed hyperlipidemia 03/08/2018    Environmental and seasonal allergies 03/01/2018    Depression 03/01/2018     She  has a past surgical history that includes [place] tooth extraction. Her family history includes COPD in her father; Hyperlipidemia in her father and mother; No Known Problems in her daughter; Vascular Disease in her daughter. She  reports that she quit smoking about 4 years ago. Her smoking use included cigarettes. She has never used smokeless tobacco. She reports that she does not drink alcohol and does not use drugs. Current Outpatient Medications   Medication Sig Dispense Refill    ALPRAZolam (XANAX) 1 mg tablet        cloNIDine (CATAPRES) 0.1 mg tablet       FLUoxetine (PROzac) 10 mg capsule       FLUoxetine (PROzac) 20 mg capsule 30 mg       fluticasone (FLONASE) 50 mcg/act nasal spray 1 spray into each nostril daily 18.2 mL 0     No current facility-administered medications for this visit. She is allergic to eggs or egg-derived products - food allergy. .    Review of Systems   Constitutional:  Negative for chills and fever. Respiratory:  Negative for shortness of breath. Cardiovascular:  Negative for chest pain.    Gastrointestinal:  Negative for abdominal pain, constipation, diarrhea, nausea and vomiting. Genitourinary:  Positive for pelvic pain. Negative for dyspareunia, dysuria, frequency, genital sores, hematuria, urgency, vaginal bleeding, vaginal discharge and vaginal pain. Musculoskeletal:  Negative for back pain and myalgias. Skin:  Negative for rash. Hematological:  Negative for adenopathy. Objective:      /82   Ht 5' 1" (1.549 m)   Wt 61.7 kg (136 lb)   LMP 11/07/2023   BMI 25.70 kg/m²          Physical Exam  Vitals and nursing note reviewed. Constitutional:       General: She is not in acute distress. Appearance: Normal appearance. She is not ill-appearing. HENT:      Head: Normocephalic and atraumatic. Eyes:      Conjunctiva/sclera: Conjunctivae normal.   Pulmonary:      Effort: Pulmonary effort is normal.   Abdominal:      Palpations: Abdomen is soft. Tenderness: There is no abdominal tenderness. Genitourinary:     General: Normal vulva. Exam position: Lithotomy position. Labia:         Right: No rash, tenderness, lesion or injury. Left: No rash, tenderness, lesion or injury. Urethra: No prolapse, urethral pain, urethral swelling or urethral lesion. Vagina: No signs of injury and foreign body. No vaginal discharge, erythema, tenderness, bleeding, lesions or prolapsed vaginal walls. Cervix: No cervical motion tenderness, discharge, friability, lesion, erythema, cervical bleeding or eversion. Uterus: Not deviated, not enlarged, not fixed, not tender and no uterine prolapse. Adnexa:         Right: No mass, tenderness or fullness. Left: No mass, tenderness or fullness. Musculoskeletal:         General: Normal range of motion. Cervical back: Neck supple. Lymphadenopathy:      Lower Body: No right inguinal adenopathy. No left inguinal adenopathy. Skin:     General: Skin is warm and dry. Neurological:      General: No focal deficit present. Mental Status: She is alert.    Psychiatric: Mood and Affect: Mood normal.         Behavior: Behavior normal.

## 2023-12-07 NOTE — ASSESSMENT & PLAN NOTE
-discussed normal variation of menstrual cycles. -UPT negative. Plan for TVUS to r/o other etiologies. No GI symptoms. UA/UC ordered for dysuria. -reviewed management of dysmenorrhea and ovulatory pain. Patient states she will likely remove anything that is implanted (IUD and Nexplanon). States she is a different person every day she wakes up and her mental state can be very unpredictable. H/o mental health problems, anxiety and depression. Concerned about BC potentially affecting her moods in a negative way. Advised patient of potential SE, risks and benefits of BC. She does not feel the benefit outweighs the risk of affecting her moods at this time. -recommend she continue tylenol and ibuprofen as needed for dysmenorrhea.

## 2023-12-09 LAB — BACTERIA UR CULT: NORMAL

## 2024-01-30 ENCOUNTER — HOSPITAL ENCOUNTER (OUTPATIENT)
Dept: ULTRASOUND IMAGING | Facility: HOSPITAL | Age: 39
Discharge: HOME | End: 2024-01-30
Payer: COMMERCIAL

## 2024-01-30 DIAGNOSIS — N92.0 MENORRHAGIA WITH REGULAR CYCLE: ICD-10-CM

## 2024-01-30 PROCEDURE — 76830 TRANSVAGINAL US NON-OB: CPT

## 2024-01-30 PROCEDURE — 76856 US EXAM PELVIC COMPLETE: CPT

## 2024-02-08 DIAGNOSIS — Z00.6 ENCOUNTER FOR EXAMINATION FOR NORMAL COMPARISON OR CONTROL IN CLINICAL RESEARCH PROGRAM: ICD-10-CM

## 2024-02-09 DIAGNOSIS — R09.81 NASAL CONGESTION: ICD-10-CM

## 2024-02-09 RX ORDER — FLUTICASONE PROPIONATE 50 MCG
SPRAY, SUSPENSION (ML) NASAL
Qty: 18.2 G | Refills: 5 | Status: SHIPPED | OUTPATIENT
Start: 2024-02-09

## 2024-02-12 ENCOUNTER — TELEPHONE (OUTPATIENT)
Age: 39
End: 2024-02-12

## 2024-02-13 ENCOUNTER — TELEPHONE (OUTPATIENT)
Age: 39
End: 2024-02-13

## 2024-02-13 ENCOUNTER — TELEPHONE (OUTPATIENT)
Dept: OBGYN CLINIC | Facility: CLINIC | Age: 39
End: 2024-02-13

## 2024-02-13 NOTE — TELEPHONE ENCOUNTER
----- Message from LEANNE Suh sent at 2/13/2024  9:58 AM EST -----  Please arrange for D&c consult with MD. [place]. Thank you!

## 2024-02-13 NOTE — TELEPHONE ENCOUNTER
Reviewed ultrasound results with patient. Reviewed probably endometrial polyp findings with recommendation for close observation and repeat US in 2-3 months vs D&c for polypectomy. She reports her menstrual cycles since her last visit are heavy and difficult to keep up with. She is not interested in any form of hormonal contraception including IUD. Patient would prefer to move forward with D&C at this time. Will arrange for MD office visit for D&c consult. All questions addressed.

## 2024-02-16 ENCOUNTER — OFFICE VISIT (OUTPATIENT)
Age: 39
End: 2024-02-16
Payer: COMMERCIAL

## 2024-02-16 VITALS
DIASTOLIC BLOOD PRESSURE: 60 MMHG | WEIGHT: 137 LBS | BODY MASS INDEX: 25.86 KG/M2 | SYSTOLIC BLOOD PRESSURE: 100 MMHG | HEART RATE: 57 BPM | HEIGHT: 61 IN | OXYGEN SATURATION: 98 %

## 2024-02-16 DIAGNOSIS — Z83.719 FAMILY HISTORY OF COLON POLYPS, UNSPECIFIED: Primary | ICD-10-CM

## 2024-02-16 DIAGNOSIS — Z80.0 FAMILY HISTORY OF COLON CANCER: ICD-10-CM

## 2024-02-16 DIAGNOSIS — R19.8 IRREGULAR BOWEL HABITS: ICD-10-CM

## 2024-02-16 DIAGNOSIS — R10.9 ABDOMINAL DISCOMFORT: ICD-10-CM

## 2024-02-16 PROCEDURE — 99204 OFFICE O/P NEW MOD 45 MIN: CPT | Performed by: PHYSICIAN ASSISTANT

## 2024-02-16 NOTE — H&P (VIEW-ONLY)
[facility] - Outpatient Consultation  Joana Stanton 38 y.o. female MRN: [id number]  Encounter: [id number]          ASSESSMENT AND PLAN:      1. Family history of colon polyps  2. Family history of colon cancer  3. Irregular bowel habits  4. Abdominal discomfort    Patient reports a maternal grandmother with colon cancer and a mother and maternal uncles with multiple colon polyps.  She also reports lower abdominal discomfort x months and mild BM irregularities at times.    Will plan for colonoscopy to investigate.  High fiber diet/supplementation and adequate water intake recommended.  ______________________________________________________________________    HPI:  Patient is a pleasant 38 year old female who presents to the office to schedule a colonoscopy.  Patient reports a maternal grandmother with colon cancer at 63 and a mother and maternal uncles with multiple colon polyps. She also reports lower abdominal discomfort x months and mild BM irregularities at times. No rectal bleeding.  She has never had a colonoscopy.  She had a pelvic ultrasound which showed a probable endometrial polyp and she will be having a D&C.      REVIEW OF SYSTEMS:    CONSTITUTIONAL: Denies any fever, chills, rigors, and weight loss.  HEENT: No earache or tinnitus. Denies hearing loss or visual disturbances.  CARDIOVASCULAR: No chest pain or palpitations.   RESPIRATORY: Denies any cough, hemoptysis, shortness of breath or dyspnea on exertion.  GASTROINTESTINAL: As noted in the History of Present Illness.   GENITOURINARY: No problems with urination. Denies any hematuria or dysuria.  NEUROLOGIC: No dizziness or vertigo, denies headaches.   MUSCULOSKELETAL: Denies any muscle or joint pain.   SKIN: Denies skin rashes or itching.   ENDOCRINE: Denies excessive thirst. Denies intolerance to heat or cold.  PSYCHOSOCIAL: Denies depression or anxiety. Denies any recent memory loss.       Historical Information  "  Past Medical History:   Diagnosis Date    Allergic     Anxiety     Depression     Opioid abuse, in remission ([facility]) [id number]    Postpartum depression     Preeclampsia     hx with first pregnancy    Varicella      Past Surgical History:   Procedure Laterality Date    WISDOM TOOTH EXTRACTION       Social History   Social History     Substance and Sexual Activity   Alcohol Use No     Social History     Substance and Sexual Activity   Drug Use No     Social History     Tobacco Use   Smoking Status Former    Current packs/day: 0.00    Types: Cigarettes    Start date: 2002    Quit date: 2019    Years since quittin.1   Smokeless Tobacco Never   Tobacco Comments    Use nicotine replacement     Family History   Problem Relation Age of Onset    Hyperlipidemia Mother     Hyperlipidemia Father     COPD Father     Vascular Disease Daughter     No Known Problems Daughter        Meds/Allergies       Current Outpatient Medications:     ALPRAZolam (XANAX) 1 mg tablet    cloNIDine (CATAPRES) 0.1 mg tablet    FLUoxetine (PROzac) 10 mg capsule    FLUoxetine (PROzac) 20 mg capsule    fluticasone (FLONASE) 50 mcg/act nasal spray    Allergies   Allergen Reactions    Eggs Or Egg-Derived Products - Food Allergy Swelling     Egg whites           Objective     Blood pressure 100/60, pulse 57, height 5' 1\" (1.549 m), weight 62.1 kg (137 lb), SpO2 98%, currently breastfeeding. Body mass index is 25.89 kg/m².        PHYSICAL EXAM:      General Appearance:   Alert, cooperative, no distress   HEENT:   Normocephalic, atraumatic, anicteric   Neck:  Supple, symmetrical, trachea midline   Lungs:   Clear to auscultation bilaterally; no rales, rhonchi or wheezing; respirations unlabored    Heart::   Regular rate and rhythm; no murmur, rub, or gallop.   Abdomen:   Soft, non-tender, non-distended; normal bowel sounds; no masses, no organomegaly    Genitalia:   Deferred    Rectal:   Deferred    Extremities:  No cyanosis, clubbing or edema  "   Pulses:  2+ and symmetric    Skin:  No jaundice, rashes, or lesions    Lymph nodes:  No palpable cervical lymphadenopathy        Lab Results:   No visits with results within 1 Day(s) from this visit.   Latest known visit with results is:   Office Visit on 12/07/2023   Component Date Value    LEUKOCYTE ESTERASE,UA 12/07/2023 neg     NITRITE,UA 12/07/2023 neg     SL AMB POCT UROBILINOGEN 12/07/2023 neg     POCT URINE PROTEIN 12/07/2023 neg      PH,UA 12/07/2023 neg     BLOOD,UA 12/07/2023 neg     SPECIFIC GRAVITY,UA 12/07/2023 neg     KETONES,UA 12/07/2023 neg     BILIRUBIN,UA 12/07/2023 neg     GLUCOSE, UA 12/07/2023 neg      COLOR,UA 12/07/2023 neg     CLARITY,UA 12/07/2023 neg     URINE HCG 12/07/2023 negative     Urine Culture 12/07/2023 20,000-29,000 cfu/ml     Color, UA 12/07/2023 Colorless     Clarity, UA 12/07/2023 Clear     Specific Gravity, UA 12/07/2023 1.004     pH, UA 12/07/2023 7.0     Leukocytes, UA 12/07/2023 Negative     Nitrite, UA 12/07/2023 Negative     Protein, UA 12/07/2023 Negative     Glucose, UA 12/07/2023 Negative     Ketones, UA 12/07/2023 Negative     Urobilinogen, UA 12/07/2023 <2.0     Bilirubin, UA 12/07/2023 Negative     Occult Blood, UA 12/07/2023 Negative     RBC, UA 12/07/2023 None Seen     WBC, UA 12/07/2023 None Seen     Epithelial Cells 12/07/2023 Occasional     Bacteria, UA 12/07/2023 None Seen          Radiology Results:   US pelvis complete w transvaginal    Result Date: 2/7/2024  Narrative: PELVIC ULTRASOUND, COMPLETE INDICATION: The patient is 38 years old. N92.0: Excessive and frequent menstruation with regular cycle. Pelvic cramping COMPARISON: None TECHNIQUE: Transabdominal pelvic ultrasound was performed in sagittal and transverse planes with a curvilinear transducer. Additional transvaginal imaging was performed to better evaluate the endometrium and ovaries. Imaging included volumetric sweeps as  well as traditional still imaging technique. FINDINGS: UTERUS: The  uterus is anteverted in position, measuring 8.7 x 3.5 x 4.4 cm. The uterus has a normal contour and echotexture. The cervix appears within normal limits. ENDOMETRIUM: The endometrial echo complex has an AP caliber of 12.0 mm. There is a mildly hyperechoic probable polyp within the endometrial cavity measuring 1.7 x 0.8 x 1.7 cm in size, with a stalk of vascularity seen on image 58. OVARIES/ADNEXA: Right ovary: 3.3 x 1.6 x 1.6 cm. 4.5 mL. Left ovary: 2.3 x 1.5 x 1.4 cm. 2.5 mL. Ovarian Doppler flow is within normal limits. No suspicious ovarian or adnexal abnormality. OTHER: Trace free fluid present within the pelvis, likely physiologic     Impression: 1. 1.7 cm probable polyp within the endometrial cavity. Further management of this finding is recommended based on clinical practice guidelines. At minimum reassessment ultrasound should be considered in 6 to 12 weeks time. 2. There is no adnexal pathology. No fibroids. Trace fluid that is likely physiologic. The examination demonstrates a significant  finding and was documented as such in [facility] for liaison and referring practitioner notification. The examination demonstrates a finding requiring imaging follow-up and was logged as such in Epic. Workstation performed: [id number]

## 2024-02-16 NOTE — PATIENT INSTRUCTIONS
Scheduled date of colonoscopy (as of today): 2/23/24  Physician performing colonoscopy: Carmine  Location of colonoscopy: [facility]  Bowel prep reviewed with patient: Miralax  Instructions reviewed with patient by: Lashawn CALLAHAN  Clearances:

## 2024-02-16 NOTE — PROGRESS NOTES
[facility] - Outpatient Consultation  Joana Stanton 38 y.o. female MRN: [id number]  Encounter: [id number]          ASSESSMENT AND PLAN:      1. Family history of colon polyps  2. Family history of colon cancer  3. Irregular bowel habits  4. Abdominal discomfort    Patient reports a maternal grandmother with colon cancer and a mother and maternal uncles with multiple colon polyps.  She also reports lower abdominal discomfort x months and mild BM irregularities at times.    Will plan for colonoscopy to investigate.  High fiber diet/supplementation and adequate water intake recommended.  ______________________________________________________________________    HPI:  Patient is a pleasant 38 year old female who presents to the office to schedule a colonoscopy.  Patient reports a maternal grandmother with colon cancer at 63 and a mother and maternal uncles with multiple colon polyps. She also reports lower abdominal discomfort x months and mild BM irregularities at times. No rectal bleeding.  She has never had a colonoscopy.  She had a pelvic ultrasound which showed a probable endometrial polyp and she will be having a D&C.      REVIEW OF SYSTEMS:    CONSTITUTIONAL: Denies any fever, chills, rigors, and weight loss.  HEENT: No earache or tinnitus. Denies hearing loss or visual disturbances.  CARDIOVASCULAR: No chest pain or palpitations.   RESPIRATORY: Denies any cough, hemoptysis, shortness of breath or dyspnea on exertion.  GASTROINTESTINAL: As noted in the History of Present Illness.   GENITOURINARY: No problems with urination. Denies any hematuria or dysuria.  NEUROLOGIC: No dizziness or vertigo, denies headaches.   MUSCULOSKELETAL: Denies any muscle or joint pain.   SKIN: Denies skin rashes or itching.   ENDOCRINE: Denies excessive thirst. Denies intolerance to heat or cold.  PSYCHOSOCIAL: Denies depression or anxiety. Denies any recent memory loss.       Historical Information  "  Past Medical History:   Diagnosis Date    Allergic     Anxiety     Depression     Opioid abuse, in remission ([facility]) [id number]    Postpartum depression     Preeclampsia     hx with first pregnancy    Varicella      Past Surgical History:   Procedure Laterality Date    WISDOM TOOTH EXTRACTION       Social History   Social History     Substance and Sexual Activity   Alcohol Use No     Social History     Substance and Sexual Activity   Drug Use No     Social History     Tobacco Use   Smoking Status Former    Current packs/day: 0.00    Types: Cigarettes    Start date: 2002    Quit date: 2019    Years since quittin.1   Smokeless Tobacco Never   Tobacco Comments    Use nicotine replacement     Family History   Problem Relation Age of Onset    Hyperlipidemia Mother     Hyperlipidemia Father     COPD Father     Vascular Disease Daughter     No Known Problems Daughter        Meds/Allergies       Current Outpatient Medications:     ALPRAZolam (XANAX) 1 mg tablet    cloNIDine (CATAPRES) 0.1 mg tablet    FLUoxetine (PROzac) 10 mg capsule    FLUoxetine (PROzac) 20 mg capsule    fluticasone (FLONASE) 50 mcg/act nasal spray    Allergies   Allergen Reactions    Eggs Or Egg-Derived Products - Food Allergy Swelling     Egg whites           Objective     Blood pressure 100/60, pulse 57, height 5' 1\" (1.549 m), weight 62.1 kg (137 lb), SpO2 98%, currently breastfeeding. Body mass index is 25.89 kg/m².        PHYSICAL EXAM:      General Appearance:   Alert, cooperative, no distress   HEENT:   Normocephalic, atraumatic, anicteric   Neck:  Supple, symmetrical, trachea midline   Lungs:   Clear to auscultation bilaterally; no rales, rhonchi or wheezing; respirations unlabored    Heart::   Regular rate and rhythm; no murmur, rub, or gallop.   Abdomen:   Soft, non-tender, non-distended; normal bowel sounds; no masses, no organomegaly    Genitalia:   Deferred    Rectal:   Deferred    Extremities:  No cyanosis, clubbing or edema  "   Pulses:  2+ and symmetric    Skin:  No jaundice, rashes, or lesions    Lymph nodes:  No palpable cervical lymphadenopathy        Lab Results:   No visits with results within 1 Day(s) from this visit.   Latest known visit with results is:   Office Visit on 12/07/2023   Component Date Value    LEUKOCYTE ESTERASE,UA 12/07/2023 neg     NITRITE,UA 12/07/2023 neg     SL AMB POCT UROBILINOGEN 12/07/2023 neg     POCT URINE PROTEIN 12/07/2023 neg      PH,UA 12/07/2023 neg     BLOOD,UA 12/07/2023 neg     SPECIFIC GRAVITY,UA 12/07/2023 neg     KETONES,UA 12/07/2023 neg     BILIRUBIN,UA 12/07/2023 neg     GLUCOSE, UA 12/07/2023 neg      COLOR,UA 12/07/2023 neg     CLARITY,UA 12/07/2023 neg     URINE HCG 12/07/2023 negative     Urine Culture 12/07/2023 20,000-29,000 cfu/ml     Color, UA 12/07/2023 Colorless     Clarity, UA 12/07/2023 Clear     Specific Gravity, UA 12/07/2023 1.004     pH, UA 12/07/2023 7.0     Leukocytes, UA 12/07/2023 Negative     Nitrite, UA 12/07/2023 Negative     Protein, UA 12/07/2023 Negative     Glucose, UA 12/07/2023 Negative     Ketones, UA 12/07/2023 Negative     Urobilinogen, UA 12/07/2023 <2.0     Bilirubin, UA 12/07/2023 Negative     Occult Blood, UA 12/07/2023 Negative     RBC, UA 12/07/2023 None Seen     WBC, UA 12/07/2023 None Seen     Epithelial Cells 12/07/2023 Occasional     Bacteria, UA 12/07/2023 None Seen          Radiology Results:   US pelvis complete w transvaginal    Result Date: 2/7/2024  Narrative: PELVIC ULTRASOUND, COMPLETE INDICATION: The patient is 38 years old. N92.0: Excessive and frequent menstruation with regular cycle. Pelvic cramping COMPARISON: None TECHNIQUE: Transabdominal pelvic ultrasound was performed in sagittal and transverse planes with a curvilinear transducer. Additional transvaginal imaging was performed to better evaluate the endometrium and ovaries. Imaging included volumetric sweeps as  well as traditional still imaging technique. FINDINGS: UTERUS: The  uterus is anteverted in position, measuring 8.7 x 3.5 x 4.4 cm. The uterus has a normal contour and echotexture. The cervix appears within normal limits. ENDOMETRIUM: The endometrial echo complex has an AP caliber of 12.0 mm. There is a mildly hyperechoic probable polyp within the endometrial cavity measuring 1.7 x 0.8 x 1.7 cm in size, with a stalk of vascularity seen on image 58. OVARIES/ADNEXA: Right ovary: 3.3 x 1.6 x 1.6 cm. 4.5 mL. Left ovary: 2.3 x 1.5 x 1.4 cm. 2.5 mL. Ovarian Doppler flow is within normal limits. No suspicious ovarian or adnexal abnormality. OTHER: Trace free fluid present within the pelvis, likely physiologic     Impression: 1. 1.7 cm probable polyp within the endometrial cavity. Further management of this finding is recommended based on clinical practice guidelines. At minimum reassessment ultrasound should be considered in 6 to 12 weeks time. 2. There is no adnexal pathology. No fibroids. Trace fluid that is likely physiologic. The examination demonstrates a significant  finding and was documented as such in [facility] for liaison and referring practitioner notification. The examination demonstrates a finding requiring imaging follow-up and was logged as such in Epic. Workstation performed: [id number]

## 2024-02-18 ENCOUNTER — HOSPITAL ENCOUNTER (EMERGENCY)
Facility: HOSPITAL | Age: 39
Discharge: HOME | End: 2024-02-18
Attending: EMERGENCY MEDICINE
Payer: COMMERCIAL

## 2024-02-18 ENCOUNTER — APPOINTMENT (EMERGENCY)
Dept: CT IMAGING | Facility: HOSPITAL | Age: 39
End: 2024-02-18
Payer: COMMERCIAL

## 2024-02-18 VITALS
HEART RATE: 68 BPM | RESPIRATION RATE: 16 BRPM | DIASTOLIC BLOOD PRESSURE: 87 MMHG | SYSTOLIC BLOOD PRESSURE: 135 MMHG | TEMPERATURE: 98.4 F | OXYGEN SATURATION: 100 %

## 2024-02-18 DIAGNOSIS — E04.1 THYROID CYST: Primary | ICD-10-CM

## 2024-02-18 LAB
ALBUMIN SERPL BCP-MCNC: 5.2 G/DL (ref 3.5–5)
ALP SERPL-CCNC: 44 U/L (ref 34–104)
ALT SERPL W P-5'-P-CCNC: 16 U/L (ref 7–52)
ANION GAP SERPL CALCULATED.3IONS-SCNC: 8 MMOL/L
AST SERPL W P-5'-P-CCNC: 23 U/L (ref 13–39)
BASOPHILS # BLD AUTO: 0.03 THOUSANDS/ÂΜL (ref 0–0.1)
BASOPHILS NFR BLD AUTO: 1 % (ref 0–1)
BILIRUB SERPL-MCNC: 0.52 MG/DL (ref 0.2–1)
BUN SERPL-MCNC: 6 MG/DL (ref 5–25)
CALCIUM SERPL-MCNC: 10.1 MG/DL (ref 8.4–10.2)
CHLORIDE SERPL-SCNC: 104 MMOL/L (ref 96–108)
CO2 SERPL-SCNC: 25 MMOL/L (ref 21–32)
CREAT SERPL-MCNC: 0.81 MG/DL (ref 0.6–1.3)
EOSINOPHIL # BLD AUTO: 0.22 THOUSAND/ÂΜL (ref 0–0.61)
EOSINOPHIL NFR BLD AUTO: 4 % (ref 0–6)
ERYTHROCYTE [DISTWIDTH] IN BLOOD BY AUTOMATED COUNT: 13.4 % (ref 11.6–15.1)
GFR SERPL CREATININE-BSD FRML MDRD: 93 ML/MIN/1.73SQ M
GLUCOSE SERPL-MCNC: 96 MG/DL (ref 65–140)
HCG SERPL QL: NEGATIVE
HCT VFR BLD AUTO: 42.8 % (ref 34.8–46.1)
HGB BLD-MCNC: 13.8 G/DL (ref 11.5–15.4)
IMM GRANULOCYTES # BLD AUTO: 0.01 THOUSAND/UL (ref 0–0.2)
IMM GRANULOCYTES NFR BLD AUTO: 0 % (ref 0–2)
LYMPHOCYTES # BLD AUTO: 1.6 THOUSANDS/ÂΜL (ref 0.6–4.47)
LYMPHOCYTES NFR BLD AUTO: 31 % (ref 14–44)
MCH RBC QN AUTO: 26.4 PG (ref 26.8–34.3)
MCHC RBC AUTO-ENTMCNC: 32.2 G/DL (ref 31.4–37.4)
MCV RBC AUTO: 82 FL (ref 82–98)
MONOCYTES # BLD AUTO: 0.27 THOUSAND/ÂΜL (ref 0.17–1.22)
MONOCYTES NFR BLD AUTO: 5 % (ref 4–12)
NEUTROPHILS # BLD AUTO: 3.07 THOUSANDS/ÂΜL (ref 1.85–7.62)
NEUTS SEG NFR BLD AUTO: 59 % (ref 43–75)
NRBC BLD AUTO-RTO: 0 /100 WBCS
PLATELET # BLD AUTO: 258 THOUSANDS/UL (ref 149–390)
PMV BLD AUTO: 8.9 FL (ref 8.9–12.7)
POTASSIUM SERPL-SCNC: 3.6 MMOL/L (ref 3.5–5.3)
PROT SERPL-MCNC: 8 G/DL (ref 6.4–8.4)
RBC # BLD AUTO: 5.22 MILLION/UL (ref 3.81–5.12)
SODIUM SERPL-SCNC: 137 MMOL/L (ref 135–147)
TSH SERPL DL<=0.05 MIU/L-ACNC: 2.23 UIU/ML (ref 0.45–4.5)
WBC # BLD AUTO: 5.2 THOUSAND/UL (ref 4.31–10.16)

## 2024-02-18 PROCEDURE — 99284 EMERGENCY DEPT VISIT MOD MDM: CPT | Performed by: PHYSICIAN ASSISTANT

## 2024-02-18 PROCEDURE — 80053 COMPREHEN METABOLIC PANEL: CPT | Performed by: PHYSICIAN ASSISTANT

## 2024-02-18 PROCEDURE — 84703 CHORIONIC GONADOTROPIN ASSAY: CPT | Performed by: PHYSICIAN ASSISTANT

## 2024-02-18 PROCEDURE — 84443 ASSAY THYROID STIM HORMONE: CPT | Performed by: PHYSICIAN ASSISTANT

## 2024-02-18 PROCEDURE — 36415 COLL VENOUS BLD VENIPUNCTURE: CPT | Performed by: PHYSICIAN ASSISTANT

## 2024-02-18 PROCEDURE — 99284 EMERGENCY DEPT VISIT MOD MDM: CPT

## 2024-02-18 PROCEDURE — 85025 COMPLETE CBC W/AUTO DIFF WBC: CPT | Performed by: PHYSICIAN ASSISTANT

## 2024-02-18 PROCEDURE — 70491 CT SOFT TISSUE NECK W/DYE: CPT

## 2024-02-18 RX ADMIN — IOHEXOL 85 ML: 350 INJECTION, SOLUTION INTRAVENOUS at 08:47

## 2024-02-18 NOTE — DISCHARGE INSTRUCTIONS
You will need an ultrasound of your thyroid for further evaluation of the cyst seen on your CT scan.    Please follow-up with your family doctor and ENT.    Return to the ER with any difficulty breathing or swallowing.

## 2024-02-18 NOTE — ED PROVIDER NOTES
History  Chief Complaint   Patient presents with    Neck Pain     Patient states she has a lump in her neck x months but feels like its become more painful     39yo female with a history of depression and anxiety presenting for evaluation of neck swelling. She reports a lump on the right side of her neck that has been ongoing for at least a month. She has intermittent discomfort but denies any pain. She is worried that the lump may be compressing a nerve. She denies any shortness of breath or dysphagia. No vomiting, diarrhea, fevers, weight loss.       History provided by:  Patient   used: No        Prior to Admission Medications   Prescriptions Last Dose Informant Patient Reported? Taking?   ALPRAZolam (XANAX) 1 mg tablet  Self Yes No   Sig:     FLUoxetine (PROzac) 10 mg capsule  Self Yes No   FLUoxetine (PROzac) 20 mg capsule  Self Yes No   Si mg    cloNIDine (CATAPRES) 0.1 mg tablet  Self Yes No   fluticasone (FLONASE) 50 mcg/act nasal spray  Self No No   Sig: INSTILL ONE SPRAY INTO EACH NOSTRIL DAILY      Facility-Administered Medications: None       Past Medical History:   Diagnosis Date    Allergic     Anxiety     Depression     Opioid abuse, in remission ([facility]) [id number]    Postpartum depression     Preeclampsia     hx with first pregnancy    Varicella        Past Surgical History:   Procedure Laterality Date    WISDOM TOOTH EXTRACTION         Family History   Problem Relation Age of Onset    Hyperlipidemia Mother     Hyperlipidemia Father     COPD Father     Vascular Disease Daughter     No Known Problems Daughter      I have reviewed and agree with the history as documented.    E-Cigarette/Vaping    E-Cigarette Use Former User     Comments vibes      E-Cigarette/Vaping Substances    Nicotine No     THC No     CBD No     Flavoring No     Other No     Unknown No      Social History     Tobacco Use    Smoking status: Former     Current packs/day: 0.00     Types: Cigarettes     Start  date: 2002     Quit date: 2019     Years since quittin.1    Smokeless tobacco: Never    Tobacco comments:     Use nicotine replacement   Vaping Use    Vaping status: Former   Substance Use Topics    Alcohol use: No    Drug use: No       Review of Systems   Constitutional:  Negative for chills and fever.   HENT:  Negative for drooling, trouble swallowing and voice change.         +Neck swelling   Eyes:  Negative for discharge and redness.   Respiratory:  Negative for shortness of breath and stridor.    Cardiovascular:  Negative for chest pain and leg swelling.   Gastrointestinal:  Negative for nausea and vomiting.   Musculoskeletal:  Negative for neck pain and neck stiffness.   Skin:  Negative for color change and rash.   Neurological:  Negative for seizures and syncope.   Psychiatric/Behavioral:  Negative for confusion. The patient is not nervous/anxious.    All other systems reviewed and are negative.      Physical Exam  Physical Exam  Vitals and nursing note reviewed.   Constitutional:       General: She is not in acute distress.     Appearance: Normal appearance. She is not toxic-appearing.   HENT:      Head: Normocephalic and atraumatic.      Right Ear: External ear normal.      Left Ear: External ear normal.      Mouth/Throat:      Mouth: Mucous membranes are moist.      Pharynx: Oropharynx is clear. No oropharyngeal exudate or posterior oropharyngeal erythema.   Eyes:      General: No scleral icterus.        Right eye: No discharge.         Left eye: No discharge.      Conjunctiva/sclera: Conjunctivae normal.   Neck:      Thyroid: Thyroid mass present.      Comments: Visible and palpable mass on the right lobe of the thyroid. Mass is firm and mobile. No erythema, warmth, or tenderness. Airway patent. Normal phonation. Tolerating oral secretions without difficulty.   Cardiovascular:      Rate and Rhythm: Normal rate.   Pulmonary:      Effort: Pulmonary effort is normal. No respiratory distress.       Breath sounds: No stridor. No wheezing or rales.   Musculoskeletal:         General: No deformity. Normal range of motion.      Cervical back: Normal range of motion.   Skin:     General: Skin is warm and dry.   Neurological:      General: No focal deficit present.      Mental Status: She is alert. Mental status is at baseline.   Psychiatric:         Mood and Affect: Mood normal.         Behavior: Behavior normal.         Vital Signs  ED Triage Vitals [02/18/24 0513]   Temperature Pulse Respirations Blood Pressure SpO2   98.4 °F (36.9 °C) 68 16 135/87 100 %      Temp Source Heart Rate Source Patient Position - Orthostatic VS BP Location FiO2 (%)   Temporal Monitor Sitting Left arm --      Pain Score       --           Vitals:    02/18/24 0513   BP: 135/87   Pulse: 68   Patient Position - Orthostatic VS: Sitting         Visual Acuity      ED Medications  Medications   iohexol (OMNIPAQUE) 350 MG/ML injection (MULTI-DOSE) 85 mL (85 mL Intravenous Given 2/18/24 0847)       Diagnostic Studies  Results Reviewed       Procedure Component Value Units Date/Time    TSH, 3rd generation with Free T4 reflex [[id number]]  (Normal) Collected: 02/18/24 0831    Lab Status: Final result Specimen: Blood from Arm, Left Updated: 02/18/24 0911     TSH 3RD GENERATON 2.228 uIU/mL     hCG, qualitative pregnancy [[id number]]  (Normal) Collected: 02/18/24 0831    Lab Status: Final result Specimen: Blood from Arm, Left Updated: 02/18/24 0911     Preg, Serum Negative    Comprehensive metabolic panel [[id number]]  (Abnormal) Collected: 02/18/24 0831    Lab Status: Final result Specimen: Blood from Arm, Left Updated: 02/18/24 0854     Sodium 137 mmol/L      Potassium 3.6 mmol/L      Chloride 104 mmol/L      CO2 25 mmol/L      ANION GAP 8 mmol/L      BUN 6 mg/dL      Creatinine 0.81 mg/dL      Glucose 96 mg/dL      Calcium 10.1 mg/dL      AST 23 U/L      ALT 16 U/L      Alkaline Phosphatase 44 U/L      Total Protein 8.0 g/dL      Albumin 5.2 g/dL       Total Bilirubin 0.52 mg/dL      eGFR 93 ml/min/1.73sq m     Narrative:      National Kidney Disease Foundation guidelines for Chronic Kidney Disease (CKD):     Stage 1 with normal or high GFR (GFR > 90 mL/min/1.73 square meters)    Stage 2 Mild CKD (GFR = 60-89 mL/min/1.73 square meters)    Stage 3A Moderate CKD (GFR = 45-59 mL/min/1.73 square meters)    Stage 3B Moderate CKD (GFR = 30-44 mL/min/1.73 square meters)    Stage 4 Severe CKD (GFR = 15-29 mL/min/1.73 square meters)    Stage 5 End Stage CKD (GFR <15 mL/min/1.73 square meters)  Note: GFR calculation is accurate only with a steady state creatinine    CBC and differential [[id number]]  (Abnormal) Collected: 02/18/24 0831    Lab Status: Final result Specimen: Blood from Arm, Left Updated: 02/18/24 0837     WBC 5.20 Thousand/uL      RBC 5.22 Million/uL      Hemoglobin 13.8 g/dL      Hematocrit 42.8 %      MCV 82 fL      MCH 26.4 pg      MCHC 32.2 g/dL      RDW 13.4 %      MPV 8.9 fL      Platelets 258 Thousands/uL      nRBC 0 /100 WBCs      Neutrophils Relative 59 %      Immat GRANS % 0 %      Lymphocytes Relative 31 %      Monocytes Relative 5 %      Eosinophils Relative 4 %      Basophils Relative 1 %      Neutrophils Absolute 3.07 Thousands/µL      Immature Grans Absolute 0.01 Thousand/uL      Lymphocytes Absolute 1.60 Thousands/µL      Monocytes Absolute 0.27 Thousand/µL      Eosinophils Absolute 0.22 Thousand/µL      Basophils Absolute 0.03 Thousands/µL                    CT soft tissue neck with contrast   Final Result by Srinivas Constantino DO (02/18 0934)      Well-circumscribed cystic mass arising from the inferior aspect of the right lobe of the thyroid gland measuring up to 4.1 cm in maximum dimension. This displaces adjacent structures including the trachea and esophagus towards the left without evidence    of severe compression. Findings most likely represent a colloid cyst. However, further evaluation with thyroid ultrasound recommended on a  "nonemergent basis.      No pathologic adenopathy.      This examination was marked \"immediate notification\" in Epic in order to begin the standard process by which the radiology reading room liaison alerts the referring practitioner.            Workstation performed: [id number]                    Procedures  Procedures         ED Course                               SBIRT 22yo+      Flowsheet Row Most Recent Value   Initial Alcohol Screen: US AUDIT-C     1. How often do you have a drink containing alcohol? 0 Filed at: 02/18/2024 0929   2. How many drinks containing alcohol do you have on a typical day you are drinking?  0 Filed at: 02/18/2024 0929   3b. FEMALE Any Age, or MALE 65+: How often do you have 4 or more drinks on one occassion? 0 Filed at: 02/18/2024 0929   Audit-C Score 0 Filed at: 02/18/2024 0929   AYLIN: How many times in the past year have you...    Used an illegal drug or used a prescription medication for non-medical reasons? Never Filed at: 02/18/2024 0516                      Medical Decision Making  38yoF here with R sided neck swelling x 1 month. No dysphagia or SOB. She is well appearing with stable vitals. There is a mass on the right lobe of the thyroid on exam. Airway patent and she is tolerating oral secretions.    Initial ED plan: Check CBC, CMP, TSH, and CT soft tissue neck.    Final assessment: Labs unremarkable including normal TSH. CT shows a 4.1cm cystic mass arising from the right lobe of the thyroid gland which displaces adjacent structures without severe compression. Discussed imaging findings with patient and need for outpatient thyroid ultrasound and ENT evaluation. Referral order placed. ED return precautions discussed including dysphagia. Patient expressed understanding and is agreeable to plan. Patient discharged in stable condition.        Problems Addressed:  Thyroid cyst: acute illness or injury    Amount and/or Complexity of Data Reviewed  Labs: ordered.  Radiology: " ordered.    Risk  Prescription drug management.             Disposition  Final diagnoses:   Thyroid cyst     Time reflects when diagnosis was documented in both MDM as applicable and the Disposition within this note       Time User Action Codes Description Comment    2/18/2024  9:48 AM Beata Wolfe Add [E04.1] Thyroid cyst           ED Disposition       ED Disposition   Discharge    Condition   Stable    Date/Time   Sun Feb 18, 2024 0948    Comment   Joana Ramirezvone discharge to home/self care.                   Follow-up Information       Follow up With Specialties Details Why Contact Info Additional Information    Irma San DO Family Medicine Schedule an appointment as soon as possible for a visit   [address] [place] [address]  [address]  [facility] [address]  [phone]       [facility] Otolaryngology Schedule an appointment as soon as possible for a visit   [address] [place] [id number] [phone] [facility] [address] [place] [address] [address]    [facility] Emergency Medicine  If symptoms worsen [address] [facility] [id number] [phone] [facility], [address] [place], [place], [address]    Mihai Frank MD Otolaryngology   [address] [facility] [address]  [phone]               Discharge Medication List as of 2/18/2024  9:50 AM        CONTINUE these medications which have NOT CHANGED    Details   ALPRAZolam (XANAX) 1 mg tablet  , Starting Sun 1/21/2018, Historical Med      cloNIDine (CATAPRES) 0.1 mg tablet Starting Fri 9/29/2023, Historical Med      !! FLUoxetine (PROzac) 10 mg capsule Starting Fri 9/29/2023, Historical Med      !! FLUoxetine (PROzac) 20 mg capsule 30 mg , Starting Sat 2/17/2018, Historical Med      fluticasone (FLONASE) 50 mcg/act nasal spray  INSTILL ONE SPRAY INTO EACH NOSTRIL DAILY, Normal       !! - Potential duplicate medications found. Please discuss with provider.              PDMP Review       None            ED Provider  Electronically Signed by             Beata Wolfe PA-C  02/19/24 8032

## 2024-02-19 ENCOUNTER — TELEPHONE (OUTPATIENT)
Age: 39
End: 2024-02-19

## 2024-02-19 DIAGNOSIS — E07.89 THYROID MASS OF UNCLEAR ETIOLOGY: Primary | ICD-10-CM

## 2024-02-19 NOTE — TELEPHONE ENCOUNTER
Joana called in, she was in the ED yesterday for a thyroid cyst and neck pain. They advised her to reach out to her PCP for a thyroid US. Please call pt once ordered. Thank you!

## 2024-02-23 ENCOUNTER — ANESTHESIA (OUTPATIENT)
Dept: GASTROENTEROLOGY | Facility: HOSPITAL | Age: 39
End: 2024-02-23

## 2024-02-23 ENCOUNTER — HOSPITAL ENCOUNTER (OUTPATIENT)
Dept: GASTROENTEROLOGY | Facility: HOSPITAL | Age: 39
Setting detail: OUTPATIENT SURGERY
End: 2024-02-23
Attending: INTERNAL MEDICINE
Payer: COMMERCIAL

## 2024-02-23 ENCOUNTER — ANESTHESIA EVENT (OUTPATIENT)
Dept: GASTROENTEROLOGY | Facility: HOSPITAL | Age: 39
End: 2024-02-23

## 2024-02-23 VITALS
BODY MASS INDEX: 25.22 KG/M2 | SYSTOLIC BLOOD PRESSURE: 106 MMHG | OXYGEN SATURATION: 100 % | WEIGHT: 133.6 LBS | DIASTOLIC BLOOD PRESSURE: 51 MMHG | RESPIRATION RATE: 16 BRPM | TEMPERATURE: 97.5 F | HEIGHT: 61 IN | HEART RATE: 70 BPM

## 2024-02-23 DIAGNOSIS — Z12.11 SCREENING FOR COLON CANCER: ICD-10-CM

## 2024-02-23 LAB
EXT PREGNANCY TEST URINE: NEGATIVE
EXT. CONTROL: NORMAL

## 2024-02-23 PROCEDURE — 45380 COLONOSCOPY AND BIOPSY: CPT | Performed by: INTERNAL MEDICINE

## 2024-02-23 PROCEDURE — 45385 COLONOSCOPY W/LESION REMOVAL: CPT | Performed by: INTERNAL MEDICINE

## 2024-02-23 PROCEDURE — 81025 URINE PREGNANCY TEST: CPT | Performed by: ANESTHESIOLOGY

## 2024-02-23 PROCEDURE — 88305 TISSUE EXAM BY PATHOLOGIST: CPT | Performed by: PATHOLOGY

## 2024-02-23 RX ORDER — PROPOFOL 10 MG/ML
INJECTION, EMULSION INTRAVENOUS AS NEEDED
Status: DISCONTINUED | OUTPATIENT
Start: 2024-02-23 | End: 2024-02-23

## 2024-02-23 RX ORDER — SODIUM CHLORIDE, SODIUM LACTATE, POTASSIUM CHLORIDE, CALCIUM CHLORIDE 600; 310; 30; 20 MG/100ML; MG/100ML; MG/100ML; MG/100ML
INJECTION, SOLUTION INTRAVENOUS CONTINUOUS PRN
Status: DISCONTINUED | OUTPATIENT
Start: 2024-02-23 | End: 2024-02-23

## 2024-02-23 RX ADMIN — SODIUM CHLORIDE, SODIUM LACTATE, POTASSIUM CHLORIDE, AND CALCIUM CHLORIDE: .6; .31; .03; .02 INJECTION, SOLUTION INTRAVENOUS at 10:35

## 2024-02-23 RX ADMIN — PROPOFOL 30 MG: 10 INJECTION, EMULSION INTRAVENOUS at 11:21

## 2024-02-23 RX ADMIN — PROPOFOL 30 MG: 10 INJECTION, EMULSION INTRAVENOUS at 11:15

## 2024-02-23 RX ADMIN — PROPOFOL 30 MG: 10 INJECTION, EMULSION INTRAVENOUS at 11:19

## 2024-02-23 RX ADMIN — PROPOFOL 120 MG: 10 INJECTION, EMULSION INTRAVENOUS at 11:12

## 2024-02-23 RX ADMIN — PROPOFOL 30 MG: 10 INJECTION, EMULSION INTRAVENOUS at 11:17

## 2024-02-23 NOTE — ANESTHESIA POSTPROCEDURE EVALUATION
Post-Op Assessment Note    CV Status:  Stable    Pain management: adequate       Mental Status:  Somnolent   Hydration Status:  Euvolemic   PONV Controlled:  Controlled   Airway Patency:  Patent     Post Op Vitals Reviewed: Yes    No anethesia notable event occurred.    Staff: CRNA               BP   124/72   Temp   98.7   Pulse  76   Resp   18   SpO2   99

## 2024-02-23 NOTE — INTERVAL H&P NOTE
H&P reviewed. After examining the patient I find no changes in the patients condition since the H&P had been written.    Vitals:    02/23/24 1029   BP: 101/62   Pulse: (!) 54   Resp: 16   Temp: 97.8 °F (36.6 °C)   SpO2: 99%

## 2024-02-23 NOTE — ANESTHESIA PREPROCEDURE EVALUATION
Procedure:  COLONOSCOPY    Relevant Problems   CARDIO   (+) Mixed hyperlipidemia      MUSCULOSKELETAL   (+) Chronic left-sided low back pain without sciatica      NEURO/PSYCH   (+) Anxiety   (+) Chronic left-sided low back pain without sciatica   (+) Depression        Physical Exam    Airway    Mallampati score: II  TM Distance: >3 FB  Neck ROM: full     Dental       Cardiovascular  Cardiovascular exam normal    Pulmonary  Pulmonary exam normal     Other Findings  post-pubertal.    Varicella    Preeclampsia hx with first pregnancy   Depression    Anxiety    Postpartum depression    Allergic    Opioid abuse, in remission (HCC)    Cyst of thyroid      Anesthesia Plan  ASA Score- 2     Anesthesia Type- IV sedation with anesthesia with ASA Monitors.         Additional Monitors:     Airway Plan:            Plan Factors-Exercise tolerance (METS): >4 METS.    Chart reviewed. EKG reviewed. Imaging results reviewed. Existing labs reviewed. Patient summary reviewed.                  Induction- intravenous.    Postoperative Plan-     Informed Consent- Anesthetic plan and risks discussed with patient.  I personally reviewed this patient with the CRNA. Discussed and agreed on the Anesthesia Plan with the CRNA..

## 2024-02-26 PROCEDURE — 88305 TISSUE EXAM BY PATHOLOGIST: CPT | Performed by: PATHOLOGY

## 2024-03-01 ENCOUNTER — HOSPITAL ENCOUNTER (OUTPATIENT)
Dept: ULTRASOUND IMAGING | Facility: HOSPITAL | Age: 39
End: 2024-03-01
Payer: COMMERCIAL

## 2024-03-01 DIAGNOSIS — E07.89 THYROID MASS OF UNCLEAR ETIOLOGY: ICD-10-CM

## 2024-03-01 PROCEDURE — 76536 US EXAM OF HEAD AND NECK: CPT

## 2024-03-06 ENCOUNTER — TELEPHONE (OUTPATIENT)
Dept: OBGYN CLINIC | Facility: CLINIC | Age: 39
End: 2024-03-06

## 2024-03-06 ENCOUNTER — OFFICE VISIT (OUTPATIENT)
Age: 39
End: 2024-03-06
Payer: COMMERCIAL

## 2024-03-06 VITALS
DIASTOLIC BLOOD PRESSURE: 62 MMHG | BODY MASS INDEX: 26.24 KG/M2 | HEIGHT: 61 IN | SYSTOLIC BLOOD PRESSURE: 88 MMHG | WEIGHT: 139 LBS

## 2024-03-06 DIAGNOSIS — N84.0 ABNORMAL UTERINE BLEEDING DUE TO ENDOMETRIAL POLYP: Primary | ICD-10-CM

## 2024-03-06 DIAGNOSIS — N93.9 ABNORMAL UTERINE BLEEDING DUE TO ENDOMETRIAL POLYP: Primary | ICD-10-CM

## 2024-03-06 PROCEDURE — 99213 OFFICE O/P EST LOW 20 MIN: CPT | Performed by: OBSTETRICS & GYNECOLOGY

## 2024-03-06 NOTE — ASSESSMENT & PLAN NOTE
Patient elects for surgical management of her endometrial polyp with EUA, hysteroscopy, D&C and polypectomy.   Risks, benefits, and alternatives have been reviewed with the patient, including but not limited to infection, bleeding, injury to the uterus and surrounding organs, such as bowel, bladder, blood vessels, nerves and ureters, risks of anesthesia, blood clots and death.  The details of the procedure were discussed in detail.  Consents have been signed and all questions have been answered to her satisfaction.

## 2024-03-06 NOTE — TELEPHONE ENCOUNTER
Talked to patient she is scheduled for her surgical procedure on 4/8/2024 with Dr. Chris in the [facility] OR, patient is scheduled for her post op appt on 4/25/2024 at 11:30am in the [place] office. Surgical packet mailed out today.

## 2024-03-06 NOTE — PROGRESS NOTES
Assessment/Plan:       Problem List Items Addressed This Visit       Abnormal uterine bleeding due to endometrial polyp - Primary     Patient elects for surgical management of her endometrial polyp with EUA, hysteroscopy, D&C and polypectomy.   Risks, benefits, and alternatives have been reviewed with the patient, including but not limited to infection, bleeding, injury to the uterus and surrounding organs, such as bowel, bladder, blood vessels, nerves and ureters, risks of anesthesia, blood clots and death.  The details of the procedure were discussed in detail.  Consents have been signed and all questions have been answered to her satisfaction.                Subjective:      Patient ID: Joana Stanton is a 38 y.o. female here for preop visit.     HPI    Patient here today to discuss surgical management of irregular bleeding- presumed to be caused by endometrial polyp.     Currently having regular menstrual cycles- around 28-30 days. Bleeds for about 4-7 days.   Heavy bleeding for 3 days.   Sometimes has bleeding in between periods.   Also notes increasingly painful periods.     Had imaging on 1/30/24   1. 1.7 cm probable polyp within the endometrial cavity. Further management of this finding is recommended based on clinical practice guidelines. At minimum reassessment ultrasound should be considered in 6 to 12 weeks time.  2. There is no adnexal pathology. No fibroids. Trace fluid that is likely physiologic.    She declines hormonal management of her irregular bleeding, and desires surgical management.     Past Medical History:   Diagnosis Date    Allergic     Anxiety     Cyst of thyroid     Depression     Opioid abuse, in remission ([facility]) [id number]    Postpartum depression     Preeclampsia     hx with first pregnancy    Varicella      Past Surgical History:   Procedure Laterality Date    WISDOM TOOTH EXTRACTION         The following portions of the patient's history were reviewed and updated as appropriate:  "allergies, current medications, past family history, past medical history, past social history, past surgical history, and problem list.    Review of Systems    Negative unless otherwise noted in HPI.     Objective:      BP (!) 88/62 (BP Location: Left arm, Patient Position: Sitting, Cuff Size: Adult)   Ht 5' 1\" (1.549 m)   Wt 63 kg (139 lb)   LMP 03/03/2024 (Exact Date)   Breastfeeding No   BMI 26.26 kg/m²          Physical Exam  Constitutional:       General: She is not in acute distress.  HENT:      Head: Normocephalic and atraumatic.      Mouth/Throat:      Mouth: Mucous membranes are moist.   Cardiovascular:      Rate and Rhythm: Normal rate.   Pulmonary:      Effort: Pulmonary effort is normal. No respiratory distress.   Abdominal:      General: There is no distension.      Palpations: Abdomen is soft.      Tenderness: There is no abdominal tenderness. There is no guarding or rebound.   Genitourinary:     Comments: Deferred to OR  Skin:     General: Skin is warm and dry.   Neurological:      General: No focal deficit present.      Mental Status: She is alert.   Psychiatric:         Mood and Affect: Mood normal.         Behavior: Behavior normal.           "

## 2024-03-06 NOTE — TELEPHONE ENCOUNTER
----- Message from Evelyn Chris DO sent at 3/6/2024  8:56 AM EST -----  [facility] GYN Department  Surgery Scheduling Sheet    Patient Name: Joana Stanton  : 1985    Provider: Evelyn Chris DO     Needed: no Language: N/A    Procedure: exam under anesthesia, dilation and curettage , operative hysteroscopy, and resection of uterine pathology  Diagnosis: AUB- endometrial polyp    [place]: undecided    Special Needs or Equipment: do we use myosure here? If yes- then myosure will be used for resection  Anesthesia: Monitored anesthesia care (MAC) with local     Length of stay: outpatient  Does patient have  comorbid conditions that will require close perioperative monitoring prior to safe discharge: no  The patient has comorbid conditions that will require close perioperative monitoring prior to safe discharge, including N/A. This may require acute care beyond the usual and routine recovery period. As such, inpatient admission post-operatively is expected and appropriate, and anticipated hospital length of stay will be >2 midnights.      Pre-Admission Testing Needed: no   Labs ordered: cbc, type and screen, and urine pregnancy test    PAT's recommended in prep for procedure ordered?: No    Medical Clearance Needed: no; Provider: N/A    MA Form Signed (tubals/hysterectomy): Not Indicated    Surgical Drink Given: no     How many days out of work: 7 day(s)     How many days no drivin day(s)       Are other appointments needed?  no  Interval for post op appt: 2 week(s)     For Surgical Scheduler:  Pre-op Appt:   Post op Appt:  Consult/medical clearance appt:

## 2024-03-07 ENCOUNTER — TELEPHONE (OUTPATIENT)
Age: 39
End: 2024-03-07

## 2024-03-07 DIAGNOSIS — E04.1 THYROID CYST: Primary | ICD-10-CM

## 2024-03-07 NOTE — TELEPHONE ENCOUNTER
I have placed a referral to interventional radiology who will review the case and assess if this is something they would perform

## 2024-03-07 NOTE — TELEPHONE ENCOUNTER
Pt called regarding her US results. The cyst is large and causes discomfort. Can it be drained? Or removed? Please advise.

## 2024-03-21 ENCOUNTER — HOSPITAL ENCOUNTER (OUTPATIENT)
Dept: ULTRASOUND IMAGING | Facility: HOSPITAL | Age: 39
End: 2024-03-21
Payer: COMMERCIAL

## 2024-03-21 DIAGNOSIS — E04.1 THYROID CYST: ICD-10-CM

## 2024-03-21 PROCEDURE — 88173 CYTOPATH EVAL FNA REPORT: CPT | Performed by: PATHOLOGY

## 2024-03-21 PROCEDURE — 10005 FNA BX W/US GDN 1ST LES: CPT

## 2024-03-21 PROCEDURE — 88172 CYTP DX EVAL FNA 1ST EA SITE: CPT | Performed by: PATHOLOGY

## 2024-03-21 RX ORDER — LIDOCAINE HYDROCHLORIDE 10 MG/ML
5 INJECTION, SOLUTION EPIDURAL; INFILTRATION; INTRACAUDAL; PERINEURAL ONCE
Status: DISCONTINUED | OUTPATIENT
Start: 2024-03-21 | End: 2024-03-25 | Stop reason: HOSPADM

## 2024-03-25 PROCEDURE — 88172 CYTP DX EVAL FNA 1ST EA SITE: CPT | Performed by: PATHOLOGY

## 2024-03-25 PROCEDURE — 88173 CYTOPATH EVAL FNA REPORT: CPT | Performed by: PATHOLOGY

## 2024-03-27 ENCOUNTER — APPOINTMENT (OUTPATIENT)
Dept: LAB | Facility: HOSPITAL | Age: 39
End: 2024-03-27
Attending: OBSTETRICS & GYNECOLOGY
Payer: COMMERCIAL

## 2024-03-27 DIAGNOSIS — E04.1 THYROID CYST: Primary | ICD-10-CM

## 2024-03-27 DIAGNOSIS — Z01.818 PREOP TESTING: ICD-10-CM

## 2024-03-27 DIAGNOSIS — Z01.818 PRE-OP TESTING: ICD-10-CM

## 2024-03-27 LAB
ABO GROUP BLD: NORMAL
BLD GP AB SCN SERPL QL: NEGATIVE
ERYTHROCYTE [DISTWIDTH] IN BLOOD BY AUTOMATED COUNT: 13.4 % (ref 11.6–15.1)
HCT VFR BLD AUTO: 36.3 % (ref 34.8–46.1)
HGB BLD-MCNC: 12 G/DL (ref 11.5–15.4)
MCH RBC QN AUTO: 26.6 PG (ref 26.8–34.3)
MCHC RBC AUTO-ENTMCNC: 33.1 G/DL (ref 31.4–37.4)
MCV RBC AUTO: 81 FL (ref 82–98)
PLATELET # BLD AUTO: 243 THOUSANDS/UL (ref 149–390)
PMV BLD AUTO: 9.1 FL (ref 8.9–12.7)
RBC # BLD AUTO: 4.51 MILLION/UL (ref 3.81–5.12)
RH BLD: POSITIVE
SPECIMEN EXPIRATION DATE: NORMAL
WBC # BLD AUTO: 4.37 THOUSAND/UL (ref 4.31–10.16)

## 2024-03-27 PROCEDURE — 36415 COLL VENOUS BLD VENIPUNCTURE: CPT

## 2024-03-27 PROCEDURE — 86850 RBC ANTIBODY SCREEN: CPT

## 2024-03-27 PROCEDURE — 86901 BLOOD TYPING SEROLOGIC RH(D): CPT

## 2024-03-27 PROCEDURE — 85027 COMPLETE CBC AUTOMATED: CPT

## 2024-03-27 PROCEDURE — 86900 BLOOD TYPING SEROLOGIC ABO: CPT

## 2024-03-28 RX ORDER — CLONIDINE HYDROCHLORIDE 0.1 MG/1
0.1 TABLET ORAL AS NEEDED
COMMUNITY

## 2024-03-28 NOTE — PRE-PROCEDURE INSTRUCTIONS
Pre-Surgery Instructions:   Medication Instructions    ALPRAZolam (XANAX) 1 mg tablet Uses PRN- OK to take day of surgery    cloNIDine (CATAPRES) 0.1 mg tablet Uses PRN- OK to take day of surgery    FLUoxetine (PROzac) 40 MG capsule Take day of surgery.    fluticasone (FLONASE) 50 mcg/act nasal spray Take day of surgery.   Medication instructions for day surgery reviewed. Please use only a sip of water to take your instructed medications. Avoid all over the counter vitamins, supplements and NSAIDS for one week prior to surgery per anesthesia guidelines. Tylenol is ok to take as needed.     You will receive a call one business day prior to surgery with an arrival time and hospital directions. If your surgery is scheduled on a Monday, the hospital will be calling you on the Friday prior to your surgery. If you have not heard from anyone by 8pm, please call the hospital supervisor through the hospital  at [phone]. ([place] [phone] or [place] [phone]).    Do not eat or drink anything after midnight the night before your surgery, including candy, mints, lifesavers, or chewing gum. Do not drink alcohol 24hrs before your surgery. Try not to smoke at least 24hrs before your surgery.       Follow the pre surgery showering instructions as listed in the “My Surgical Experience Booklet” or otherwise provided by your surgeon's office. Do not use a blade to shave the surgical area 1 week before surgery. It is okay to use a clean electric clippers up to 24 hours before surgery. Do not apply any lotions, creams, including makeup, cologne, deodorant, or perfumes after showering on the day of your surgery. Do not use dry shampoo, hair spray, hair gel, or any type of hair products.     No contact lenses, eye make-up, or artificial eyelashes. Remove nail polish, including gel polish, and any artificial, gel, or acrylic nails if possible. Remove all jewelry including rings and body piercing jewelry.     Wear  causal clothing that is easy to take on and off. Consider your type of surgery.    Keep any valuables, jewelry, piercings at home. Please bring any specially ordered equipment (sling, braces) if indicated.    Arrange for a responsible person to drive you to and from the hospital on the day of your surgery. Please confirm the visitor policy for the day of your procedure when you receive your phone call with an arrival time.     Call the surgeon's office with any new illnesses, exposures, or additional questions prior to surgery.    Please reference your “My Surgical Experience Booklet” for additional information to prepare for your upcoming surgery.

## 2024-04-07 ENCOUNTER — ANESTHESIA EVENT (OUTPATIENT)
Dept: PERIOP | Facility: HOSPITAL | Age: 39
End: 2024-04-07
Payer: COMMERCIAL

## 2024-04-07 NOTE — ANESTHESIA PREPROCEDURE EVALUATION
Procedure:  D&C HYSTEROSCOPY EXAM UNDER ANESTHESIA (Uterus)  POLYPECTOMY (Uterus)    Relevant Problems   CARDIO   (+) Mixed hyperlipidemia      MUSCULOSKELETAL   (+) Chronic left-sided low back pain without sciatica      NEURO/PSYCH   (+) Anxiety   (+) Chronic left-sided low back pain without sciatica   (+) Depression        Physical Exam    Airway    Mallampati score: II  TM Distance: >3 FB  Neck ROM: full     Dental   No notable dental hx     Cardiovascular  Rhythm: regular, Rate: normal, Cardiovascular exam normal    Pulmonary  Pulmonary exam normal Breath sounds clear to auscultation    Other Findings  post-pubertal.      Anesthesia Plan  ASA Score- 2     Anesthesia Type- IV sedation with anesthesia with ASA Monitors.         Additional Monitors:     Airway Plan:     Comment: Discussed risks/benefits, including medication reactions, awareness, aspiration, and serious/life threatening complications.    Plan to maintain native airway with IVGA, monitored with EtCO2.       Plan Factors-Exercise tolerance (METS): >4 METS.    Chart reviewed.    Patient summary reviewed.    Patient is not a current smoker.  Patient instructed to abstain from smoking on day of procedure. Patient did not smoke on day of surgery.            Induction- intravenous.    Postoperative Plan-     Informed Consent- Anesthetic plan and risks discussed with patient.  I personally reviewed this patient with the CRNA. Discussed and agreed on the Anesthesia Plan with the CRNA..

## 2024-04-08 ENCOUNTER — HOSPITAL ENCOUNTER (OUTPATIENT)
Facility: HOSPITAL | Age: 39
Setting detail: OUTPATIENT SURGERY
Discharge: HOME | End: 2024-04-08
Attending: OBSTETRICS & GYNECOLOGY | Admitting: OBSTETRICS & GYNECOLOGY
Payer: COMMERCIAL

## 2024-04-08 ENCOUNTER — ANESTHESIA (OUTPATIENT)
Dept: PERIOP | Facility: HOSPITAL | Age: 39
End: 2024-04-08
Payer: COMMERCIAL

## 2024-04-08 VITALS
DIASTOLIC BLOOD PRESSURE: 73 MMHG | SYSTOLIC BLOOD PRESSURE: 115 MMHG | HEART RATE: 57 BPM | HEIGHT: 61 IN | RESPIRATION RATE: 20 BRPM | OXYGEN SATURATION: 100 % | WEIGHT: 136.24 LBS | TEMPERATURE: 97.3 F | BODY MASS INDEX: 25.72 KG/M2

## 2024-04-08 DIAGNOSIS — N84.0 ENDOMETRIAL POLYP: ICD-10-CM

## 2024-04-08 DIAGNOSIS — N93.9 ABNORMAL UTERINE BLEEDING (AUB): ICD-10-CM

## 2024-04-08 LAB
EXT PREGNANCY TEST URINE: NEGATIVE
EXT. CONTROL: NORMAL

## 2024-04-08 PROCEDURE — NC001 PR NO CHARGE: Performed by: OBSTETRICS & GYNECOLOGY

## 2024-04-08 PROCEDURE — 81025 URINE PREGNANCY TEST: CPT | Performed by: OBSTETRICS & GYNECOLOGY

## 2024-04-08 PROCEDURE — 88312 SPECIAL STAINS GROUP 1: CPT | Performed by: STUDENT IN AN ORGANIZED HEALTH CARE EDUCATION/TRAINING PROGRAM

## 2024-04-08 PROCEDURE — 88305 TISSUE EXAM BY PATHOLOGIST: CPT | Performed by: STUDENT IN AN ORGANIZED HEALTH CARE EDUCATION/TRAINING PROGRAM

## 2024-04-08 PROCEDURE — 58558 HYSTEROSCOPY BIOPSY: CPT | Performed by: OBSTETRICS & GYNECOLOGY

## 2024-04-08 PROCEDURE — 88342 IMHCHEM/IMCYTCHM 1ST ANTB: CPT | Performed by: STUDENT IN AN ORGANIZED HEALTH CARE EDUCATION/TRAINING PROGRAM

## 2024-04-08 RX ORDER — SODIUM CHLORIDE, SODIUM LACTATE, POTASSIUM CHLORIDE, CALCIUM CHLORIDE 600; 310; 30; 20 MG/100ML; MG/100ML; MG/100ML; MG/100ML
INJECTION, SOLUTION INTRAVENOUS CONTINUOUS PRN
Status: DISCONTINUED | OUTPATIENT
Start: 2024-04-08 | End: 2024-04-08

## 2024-04-08 RX ORDER — MAGNESIUM HYDROXIDE 1200 MG/15ML
LIQUID ORAL AS NEEDED
Status: DISCONTINUED | OUTPATIENT
Start: 2024-04-08 | End: 2024-04-08 | Stop reason: HOSPADM

## 2024-04-08 RX ORDER — GLYCOPYRROLATE 0.2 MG/ML
INJECTION INTRAMUSCULAR; INTRAVENOUS AS NEEDED
Status: DISCONTINUED | OUTPATIENT
Start: 2024-04-08 | End: 2024-04-08

## 2024-04-08 RX ORDER — SODIUM CHLORIDE, SODIUM LACTATE, POTASSIUM CHLORIDE, CALCIUM CHLORIDE 600; 310; 30; 20 MG/100ML; MG/100ML; MG/100ML; MG/100ML
125 INJECTION, SOLUTION INTRAVENOUS CONTINUOUS
Status: DISCONTINUED | OUTPATIENT
Start: 2024-04-08 | End: 2024-04-08 | Stop reason: HOSPADM

## 2024-04-08 RX ORDER — FENTANYL CITRATE/PF 50 MCG/ML
50 SYRINGE (ML) INJECTION
Status: DISCONTINUED | OUTPATIENT
Start: 2024-04-08 | End: 2024-04-08 | Stop reason: HOSPADM

## 2024-04-08 RX ORDER — HYDROMORPHONE HCL/PF 1 MG/ML
0.5 SYRINGE (ML) INJECTION
Status: DISCONTINUED | OUTPATIENT
Start: 2024-04-08 | End: 2024-04-08 | Stop reason: HOSPADM

## 2024-04-08 RX ORDER — ONDANSETRON 2 MG/ML
INJECTION INTRAMUSCULAR; INTRAVENOUS AS NEEDED
Status: DISCONTINUED | OUTPATIENT
Start: 2024-04-08 | End: 2024-04-08

## 2024-04-08 RX ORDER — DEXAMETHASONE SODIUM PHOSPHATE 10 MG/ML
INJECTION, SOLUTION INTRAMUSCULAR; INTRAVENOUS AS NEEDED
Status: DISCONTINUED | OUTPATIENT
Start: 2024-04-08 | End: 2024-04-08

## 2024-04-08 RX ORDER — ONDANSETRON 2 MG/ML
4 INJECTION INTRAMUSCULAR; INTRAVENOUS ONCE AS NEEDED
Status: DISCONTINUED | OUTPATIENT
Start: 2024-04-08 | End: 2024-04-08 | Stop reason: HOSPADM

## 2024-04-08 RX ORDER — LIDOCAINE HYDROCHLORIDE 10 MG/ML
INJECTION, SOLUTION EPIDURAL; INFILTRATION; INTRACAUDAL; PERINEURAL AS NEEDED
Status: DISCONTINUED | OUTPATIENT
Start: 2024-04-08 | End: 2024-04-08

## 2024-04-08 RX ORDER — LIDOCAINE HYDROCHLORIDE 10 MG/ML
0.5 INJECTION, SOLUTION EPIDURAL; INFILTRATION; INTRACAUDAL; PERINEURAL ONCE AS NEEDED
Status: DISCONTINUED | OUTPATIENT
Start: 2024-04-08 | End: 2024-04-08 | Stop reason: HOSPADM

## 2024-04-08 RX ORDER — MIDAZOLAM HYDROCHLORIDE 2 MG/2ML
INJECTION, SOLUTION INTRAMUSCULAR; INTRAVENOUS AS NEEDED
Status: DISCONTINUED | OUTPATIENT
Start: 2024-04-08 | End: 2024-04-08

## 2024-04-08 RX ORDER — KETOROLAC TROMETHAMINE 30 MG/ML
INJECTION, SOLUTION INTRAMUSCULAR; INTRAVENOUS AS NEEDED
Status: DISCONTINUED | OUTPATIENT
Start: 2024-04-08 | End: 2024-04-08

## 2024-04-08 RX ORDER — PROPOFOL 10 MG/ML
INJECTION, EMULSION INTRAVENOUS AS NEEDED
Status: DISCONTINUED | OUTPATIENT
Start: 2024-04-08 | End: 2024-04-08

## 2024-04-08 RX ORDER — FENTANYL CITRATE 50 UG/ML
INJECTION, SOLUTION INTRAMUSCULAR; INTRAVENOUS AS NEEDED
Status: DISCONTINUED | OUTPATIENT
Start: 2024-04-08 | End: 2024-04-08

## 2024-04-08 RX ORDER — PROPOFOL 10 MG/ML
INJECTION, EMULSION INTRAVENOUS CONTINUOUS PRN
Status: DISCONTINUED | OUTPATIENT
Start: 2024-04-08 | End: 2024-04-08

## 2024-04-08 RX ADMIN — GLYCOPYRROLATE 0.1 MG: 0.2 INJECTION INTRAMUSCULAR; INTRAVENOUS at 10:13

## 2024-04-08 RX ADMIN — FENTANYL CITRATE 25 MCG: 50 INJECTION, SOLUTION INTRAMUSCULAR; INTRAVENOUS at 10:24

## 2024-04-08 RX ADMIN — FENTANYL CITRATE 25 MCG: 50 INJECTION, SOLUTION INTRAMUSCULAR; INTRAVENOUS at 10:19

## 2024-04-08 RX ADMIN — SODIUM CHLORIDE, SODIUM LACTATE, POTASSIUM CHLORIDE, AND CALCIUM CHLORIDE: .6; .31; .03; .02 INJECTION, SOLUTION INTRAVENOUS at 10:15

## 2024-04-08 RX ADMIN — KETOROLAC TROMETHAMINE 30 MG: 30 INJECTION, SOLUTION INTRAMUSCULAR; INTRAVENOUS at 10:40

## 2024-04-08 RX ADMIN — SODIUM CHLORIDE, SODIUM LACTATE, POTASSIUM CHLORIDE, AND CALCIUM CHLORIDE 125 ML/HR: .6; .31; .03; .02 INJECTION, SOLUTION INTRAVENOUS at 09:34

## 2024-04-08 RX ADMIN — PROPOFOL 50 MG: 10 INJECTION, EMULSION INTRAVENOUS at 10:20

## 2024-04-08 RX ADMIN — SODIUM CHLORIDE 4 MCG: 9 INJECTION, SOLUTION INTRAVENOUS at 10:13

## 2024-04-08 RX ADMIN — MIDAZOLAM HYDROCHLORIDE 2 MG: 1 INJECTION, SOLUTION INTRAMUSCULAR; INTRAVENOUS at 10:13

## 2024-04-08 RX ADMIN — ONDANSETRON 4 MG: 2 INJECTION INTRAMUSCULAR; INTRAVENOUS at 10:40

## 2024-04-08 RX ADMIN — LIDOCAINE HYDROCHLORIDE 50 MG: 10 INJECTION, SOLUTION EPIDURAL; INFILTRATION; INTRACAUDAL; PERINEURAL at 10:19

## 2024-04-08 RX ADMIN — PROPOFOL 200 MG: 10 INJECTION, EMULSION INTRAVENOUS at 10:19

## 2024-04-08 RX ADMIN — FENTANYL CITRATE 25 MCG: 50 INJECTION, SOLUTION INTRAMUSCULAR; INTRAVENOUS at 10:32

## 2024-04-08 RX ADMIN — FENTANYL CITRATE 25 MCG: 50 INJECTION, SOLUTION INTRAMUSCULAR; INTRAVENOUS at 10:29

## 2024-04-08 RX ADMIN — PROPOFOL 100 MCG/KG/MIN: 10 INJECTION, EMULSION INTRAVENOUS at 10:21

## 2024-04-08 RX ADMIN — DEXAMETHASONE SODIUM PHOSPHATE 10 MG: 10 INJECTION INTRAMUSCULAR; INTRAVENOUS at 10:20

## 2024-04-08 NOTE — ANESTHESIA POSTPROCEDURE EVALUATION
Post-Op Assessment Note    CV Status:  Stable  Pain Score: 0    Pain management: adequate       Mental Status:  Alert and awake   Hydration Status:  Euvolemic   PONV Controlled:  Controlled   Airway Patency:  Patent and adequate  Two or more mitigation strategies used for obstructive sleep apnea   Post Op Vitals Reviewed: Yes    No anethesia notable event occurred.    Staff: CRNA               BP   131/89   Temp 97.2   Pulse 80   Resp 20   SpO2 95

## 2024-04-08 NOTE — H&P
H&P Exam - Gynecology   Joana Stanton 38 y.o. female MRN: [id number]  Unit/Bed#: OR [place] Encounter: [id number]    Assessment/Plan     Assessment/Plan:  Abnormal uterine bleeding due to endometrial polyp - Primary        Patient elects for surgical management of her endometrial polyp with EUA, hysteroscopy, D&C and polypectomy.   Risks, benefits, and alternatives have been reviewed with the patient, including but not limited to infection, bleeding, injury to the uterus and surrounding organs, such as bowel, bladder, blood vessels, nerves and ureters, risks of anesthesia, blood clots and death.  The details of the procedure were discussed in detail.  Consents have been signed and all questions have been answered to her satisfaction.          History of Present Illness     HPI:  Joana Stanton is a 38 y.o. female who presents today for scheduled surgery. She was found to have an endometrial polyp on ultrasound while undergoing workup for heavy menstrual bleeding. She elects for surgical management.     There have been no changes to her medical condition since her last visit.     Review of Systems  Negative unless otherwise noted in HPI.     Historical Information   Past Medical History:   Diagnosis Date    Allergic     Anxiety     Cyst of thyroid     Depression     Opioid abuse, in remission ([facility]) [phone]    Postpartum depression     Preeclampsia     hx with first pregnancy    Varicella      Past Surgical History:   Procedure Laterality Date    COLONOSCOPY      US GUIDED THYROID BIOPSY  2024    WISDOM TOOTH EXTRACTION       OB/GYN History:     Family History   Problem Relation Age of Onset    Hyperlipidemia Mother     Hyperlipidemia Father     COPD Father     Vascular Disease Daughter     No Known Problems Daughter      Social History   Social History     Substance and Sexual Activity   Alcohol Use No     Social History     Substance and Sexual Activity   Drug Use No    Comment: has medical  "marijuana card- not using     Social History     Tobacco Use   Smoking Status Former    Current packs/day: 0.00    Types: Cigarettes    Start date: 2002    Quit date: 2019    Years since quittin.2   Smokeless Tobacco Never   Tobacco Comments    Use nicotine replacement     E-Cigarette/Vaping    E-Cigarette Use Former User     Start Date 18     Quit Date 19      E-Cigarette/Vaping Substances    Nicotine No     THC No     CBD No     Flavoring No     Other No     Unknown No        Meds/Allergies   all current active meds have been reviewed and current meds:   Current Facility-Administered Medications   Medication Dose Route Frequency    lactated ringers infusion  125 mL/hr Intravenous Continuous    lidocaine (PF) (XYLOCAINE-MPF) 1 % injection 0.5 mL  0.5 mL Infiltration Once PRN     Allergies   Allergen Reactions    Eggs Or Egg-Derived Products - Food Allergy Swelling     Egg whites       Objective   Vitals: Blood pressure 113/82, pulse 64, temperature (!) 97.2 °F (36.2 °C), temperature source Temporal, resp. rate 19, height 5' 1\" (1.549 m), weight 61.8 kg (136 lb 3.9 oz), last menstrual period 2024, SpO2 100%, not currently breastfeeding.    No intake or output data in the 24 hours ending 24 1002    Invasive Devices:   Invasive Devices       Peripheral Intravenous Line  Duration             Peripheral IV 24 Right Arm <1 day                    Physical Exam  HENT:      Head: Normocephalic and atraumatic.      Mouth/Throat:      Mouth: Mucous membranes are moist.   Cardiovascular:      Rate and Rhythm: Normal rate.   Pulmonary:      Effort: Pulmonary effort is normal. No respiratory distress.   Abdominal:      General: There is no distension.      Palpations: Abdomen is soft.   Genitourinary:     Comments: Deferred to OR  Skin:     General: Skin is warm and dry.   Neurological:      Mental Status: She is alert.   Psychiatric:         Mood and Affect: Mood normal.         Behavior: " Behavior normal.         Lab Results: I have personally reviewed pertinent reports.    Imaging: I have personally reviewed pertinent reports.    EKG, Pathology, and Other Studies: I have personally reviewed pertinent reports.      Code Status: Prior  Advance Directive and Living Will:      Power of :    POLST:

## 2024-04-08 NOTE — OP NOTE
OPERATIVE REPORT  PATIENT NAME: Joana Stanton    :  1985  MRN: [id number]  Pt Location: [facility] OR ROOM 01    SURGERY DATE: 2024    Surgeons and Role:     * Evelyn Chris DO - Primary    Preop Diagnosis:  Abnormal uterine bleeding (AUB) [N93.9]  Endometrial polyp [N84.0]    Post-Op Diagnosis Codes:     * Abnormal uterine bleeding (AUB) [N93.9]     * Endometrial polyp [N84.0]    Procedure(s):  D&C HYSTEROSCOPY EXAM UNDER ANESTHESIA  POLYPECTOMY    Specimen(s):  ID Type Source Tests Collected by Time Destination   1 : Endometrial Currettings Tissue Polyp, Cervical/Endometrial TISSUE EXAM Evelyn Chris DO 2024 1037    2 : Endometrial Polyp Tissue Polyp, Cervical/Endometrial TISSUE EXAM Evelyn Chris,  2024 1038        Estimated Blood Loss:   Minimal    Drains:  * No LDAs found *    Anesthesia Type:   IV Sedation with Anesthesia    Operative Indications:  Abnormal uterine bleeding (AUB) [N93.9]  Endometrial polyp [N84.0]    Operative Findings:  Normal appearing external genitalia, multiparous cervix that was grossly normal appearing, anteverted uterus sounded to 8 cm, endometrial polyp left side wall near the fundus, b/l tubal ostia visualized    Complications:   None    Procedure and Technique:    The patient was taken to the Operating Room where she was identified as Joana Stanton. General LMA anesthesia anesthesia was obtained without difficulty and the patient was placed in the dorsal lithotomy position where the exam under anesthesia revealed the above noted findings.  The vagina and perineum were prepped and draped in the usual sterile fashion.  A time out procedure was performed and after all team members agreed, a weighted speculum was placed in the patient's vagina with a Kwame to retract anteriorly. The anterior lip of the cervix was grasped with a single tooth tenaculum.  The uterus was sounded in an anteverted fashion to 8 cm.  The cervix was gently dilated up to 18 Fr. using  the miranda dilators without difficulty.  The operative hysteroscope was advanced into the uterine cavity with the above noted findings. The symphion device was brought onto the field and the endometrial polyp was morcellated under direct visualization with a final deficit of 100 cc of NSS.  Excellent hemostasis was noted.  A sharp curette was inserted and all 4 quadrants of the uterus were sampled.  Endometrial curettings and the pieces of endometrial polyp were passed off the field for specimen.  There was no bleeding noted at the os.  Tenaculum was removed.  No bleeding was noted at the tenaculum site.  Weighted speculum was removed.     The patient tolerated the procedure well.  Sponge, lap, needle and instrument counts were correct x 2.  The patient was taken to the Recovery Room in an awake and stable condition.      Patient Disposition:  PACU         SIGNATURE: Evelyn Chris DO  DATE: April 8, 2024  TIME: 10:42 AM

## 2024-04-17 PROCEDURE — 88342 IMHCHEM/IMCYTCHM 1ST ANTB: CPT | Performed by: STUDENT IN AN ORGANIZED HEALTH CARE EDUCATION/TRAINING PROGRAM

## 2024-04-17 PROCEDURE — 88305 TISSUE EXAM BY PATHOLOGIST: CPT | Performed by: STUDENT IN AN ORGANIZED HEALTH CARE EDUCATION/TRAINING PROGRAM

## 2024-04-17 PROCEDURE — 88312 SPECIAL STAINS GROUP 1: CPT | Performed by: STUDENT IN AN ORGANIZED HEALTH CARE EDUCATION/TRAINING PROGRAM

## 2024-04-18 ENCOUNTER — TELEPHONE (OUTPATIENT)
Age: 39
End: 2024-04-18

## 2024-04-18 NOTE — TELEPHONE ENCOUNTER
Patient called about her tissue results from 4/8/24. She states she read the report on [employer] and it is giving her more anxiety. I reviewed provider's message that the sample can back benign and that this can reviewed further at her post op appointment. Patient is requesting further explanation of differential diagnosis.  Note: The differential diagnosis for the small histiocytoid aggregate includes a small granuloma (nonspecific) and a small placental site nodule (PSN). A CD68 immunostain shows focal cytoplasmic positivity in the histiocytoid aggregate, while a p63 immunostain is deemed noncontributory due to absence of the histiocytoid aggregate on this section. Consequently, a small PSN cannot be entirely excluded. No fungal organisms or acid fast bacilli are identified on GMS, PAS-F, and AFB special stains. Clinical correlation is suggested.  Message routed to provider.

## 2024-06-04 ENCOUNTER — APPOINTMENT (OUTPATIENT)
Dept: RADIOLOGY | Facility: MEDICAL CENTER | Age: 39
End: 2024-06-04
Payer: COMMERCIAL

## 2024-06-04 ENCOUNTER — OFFICE VISIT (OUTPATIENT)
Dept: FAMILY MEDICINE CLINIC | Facility: MEDICAL CENTER | Age: 39
End: 2024-06-04
Payer: COMMERCIAL

## 2024-06-04 VITALS
RESPIRATION RATE: 18 BRPM | HEART RATE: 68 BPM | DIASTOLIC BLOOD PRESSURE: 70 MMHG | TEMPERATURE: 97.3 F | WEIGHT: 134.8 LBS | BODY MASS INDEX: 25.47 KG/M2 | SYSTOLIC BLOOD PRESSURE: 100 MMHG | OXYGEN SATURATION: 99 %

## 2024-06-04 DIAGNOSIS — G89.29 CHRONIC PAIN OF RIGHT KNEE: ICD-10-CM

## 2024-06-04 DIAGNOSIS — M25.561 CHRONIC PAIN OF RIGHT KNEE: ICD-10-CM

## 2024-06-04 DIAGNOSIS — M25.561 CHRONIC PAIN OF RIGHT KNEE: Primary | ICD-10-CM

## 2024-06-04 DIAGNOSIS — G89.29 CHRONIC PAIN OF RIGHT KNEE: Primary | ICD-10-CM

## 2024-06-04 PROCEDURE — 73562 X-RAY EXAM OF KNEE 3: CPT

## 2024-06-04 PROCEDURE — 99213 OFFICE O/P EST LOW 20 MIN: CPT | Performed by: STUDENT IN AN ORGANIZED HEALTH CARE EDUCATION/TRAINING PROGRAM

## 2024-06-04 NOTE — PROGRESS NOTES
"  [facility] - Clinic Note  Irma San DO, 24     Joana Stanton MRN: [id number] : 1985 Age: 38 y.o.     Assessment/Plan     1. Chronic pain of right knee    -Advised to avoid offending activities  -Follow-up imaging  -NSAID as needed  - XR knee 3 vw right non injury; Future  - Ambulatory Referral to Orthopedic Surgery; Future    Joana Stanton acknowledged understanding of treatment plan, all questions answered.    Subjective      Joana Stanton is a 38 y.o. female who presents for visit to discuss knee pain.  She complains of right knee pain.  Patient unsure exactly when onset of pain.  She notes it has been bothersome for at least 3 months.  Patient mentions pain medial aspect of right knee that sometimes radiates above and below her knee and up and down her right lower extremity.  Patient has not noticed any erythema or swelling.  Patient states certain activities such as squatting and climbing are difficult.  Patient expresses she enjoys climbing and would like to continue to do so but pain is limiting. \"Pain isn't unbearable so not taking too much for it\", \" pops a lot\".     The following portions of the patient's history were reviewed and updated as appropriate: allergies, current medications, past family history, past medical history, past social history, past surgical history and problem list.     Past Medical History:   Diagnosis Date    Allergic     Anxiety     Cyst of thyroid     Depression     Opioid abuse, in remission ([facility]) [phone]    Postpartum depression     Preeclampsia     hx with first pregnancy    Varicella        Allergies   Allergen Reactions    Eggs Or Egg-Derived Products - Food Allergy Swelling     Egg whites       Past Surgical History:   Procedure Laterality Date    COLONOSCOPY      OR HYSTEROSCOPY BX ENDOMETRIUM&/POLYPC W/WO D&C N/A 2024    Procedure: D&C HYSTEROSCOPY EXAM UNDER ANESTHESIA;  Surgeon: Evelyn Chris DO;  Location: [facility] " OR;  Service: Gynecology    [place] HYSTEROSCOPY BX ENDOMETRIUM&/POLYPC W/WO D&C N/A 2024    Procedure: POLYPECTOMY;  Surgeon: Evelyn Chris DO;  Location: [facility];  Service: Gynecology    US GUIDED THYROID BIOPSY  2024    WISDOM TOOTH EXTRACTION         Family History   Problem Relation Age of Onset    Hyperlipidemia Mother     Hyperlipidemia Father     COPD Father     Vascular Disease Daughter     No Known Problems Daughter        Social History     Socioeconomic History    Marital status: /Civil Union     Spouse name: None    Number of children: None    Years of education: None    Highest education level: None   Occupational History    None   Tobacco Use    Smoking status: Former     Current packs/day: 0.00     Types: Cigarettes     Start date: 2002     Quit date: 2019     Years since quittin.4    Smokeless tobacco: Never    Tobacco comments:     Use nicotine replacement   Vaping Use    Vaping status: Former    Start date: 2016    Quit date: 2019   Substance and Sexual Activity    Alcohol use: No    Drug use: No     Comment: has medical marijuana card- not using    Sexual activity: Yes     Partners: Male     Birth control/protection: Male Sterilization, Coitus interruptus   Other Topics Concern    None   Social History Narrative    None     Social Determinants of Health     Financial Resource Strain: Not on file   Food Insecurity: Not on file   Transportation Needs: Not on file   Physical Activity: Not on file   Stress: Not on file   Social Connections: Not on file   Intimate Partner Violence: Not on file   Housing Stability: Not on file       Current Outpatient Medications   Medication Sig Dispense Refill    ALPRAZolam (XANAX) 1 mg tablet Take 1 mg by mouth if needed      cloNIDine (CATAPRES) 0.1 mg tablet Take 0.1 mg by mouth if needed for high blood pressure      FLUoxetine (PROzac) 40 MG capsule Take 30 mg by mouth every morning Taking 30 mg daily      fluticasone  "(FLONASE) 50 mcg/act nasal spray INSTILL ONE SPRAY INTO EACH NOSTRIL DAILY 18.2 g 5     No current facility-administered medications for this visit.       Review of Systems     As noted in HPI    Objective      /70 (BP Location: Left arm, Patient Position: Sitting, Cuff Size: Standard)   Pulse 68   Temp (!) 97.3 °F (36.3 °C) (Temporal)   Resp 18   Wt 61.1 kg (134 lb 12.8 oz)   SpO2 99%   BMI 25.47 kg/m²     Physical Exam  Vitals reviewed.   Constitutional:       General: She is not in acute distress.     Appearance: Normal appearance.   HENT:      Head: Normocephalic and atraumatic.   Eyes:      Conjunctiva/sclera: Conjunctivae normal.   Pulmonary:      Effort: Pulmonary effort is normal.   Musculoskeletal:      Right knee: No swelling, deformity, effusion, erythema, ecchymosis, lacerations or bony tenderness. Normal range of motion. Tenderness present over the medial joint line. No LCL laxity, MCL laxity, ACL laxity or PCL laxity. Normal alignment and normal patellar mobility. Normal pulse.      Instability Tests: Anterior drawer test negative. Posterior drawer test negative. Anterior Lachman test negative. Medial Dayan test negative and lateral Dayan test negative.      Left knee: Normal.      Comments: Positive Thessaly test right   Neurological:      Mental Status: She is alert and oriented to person, place, and time.      Comments: 5 out of 5 muscle strength bilateral lower extremities    Sensation to light touch intact bilateral lower extremities   Psychiatric:         Mood and Affect: Mood normal.         Behavior: Behavior normal.         Thought Content: Thought content normal.             Some portions of this record may have been generated with voice recognition software. There may be translation, syntax, or grammatical errors. Occasional wrong word or \"sound-a-like\" substitutions may have occurred due to the inherent limitations of the voice recognition software. Read the chart " carefully and recognize, using context, where substations may have occurred. If you have any questions, please contact the dictating provider for clarification or correction, as needed.

## 2024-06-05 ENCOUNTER — TELEPHONE (OUTPATIENT)
Age: 39
End: 2024-06-05

## 2024-07-26 DIAGNOSIS — R09.81 NASAL CONGESTION: ICD-10-CM

## 2024-07-26 RX ORDER — FLUTICASONE PROPIONATE 50 MCG
SPRAY, SUSPENSION (ML) NASAL
Qty: 16 G | Refills: 1 | Status: SHIPPED | OUTPATIENT
Start: 2024-07-26

## 2024-07-31 ENCOUNTER — VBI (OUTPATIENT)
Dept: ADMINISTRATIVE | Facility: OTHER | Age: 39
End: 2024-07-31

## 2024-07-31 NOTE — TELEPHONE ENCOUNTER
07/31/24 1:35 PM     Chart reviewed for Pap Smear (HPV) aka Cervical Cancer Screening ; nothing is submitted to the patient's insurance at this time.     Isabel Jessica   [facility]

## 2024-08-02 ENCOUNTER — CONSULT (OUTPATIENT)
Dept: MULTI SPECIALTY CLINIC | Facility: CLINIC | Age: 39
End: 2024-08-02

## 2024-08-02 VITALS
WEIGHT: 137.8 LBS | OXYGEN SATURATION: 98 % | SYSTOLIC BLOOD PRESSURE: 106 MMHG | TEMPERATURE: 98.6 F | HEART RATE: 66 BPM | BODY MASS INDEX: 26.04 KG/M2 | DIASTOLIC BLOOD PRESSURE: 71 MMHG

## 2024-08-02 DIAGNOSIS — R09.81 NASAL CONGESTION: Primary | ICD-10-CM

## 2024-08-02 DIAGNOSIS — E07.89 THYROID MASS OF UNCLEAR ETIOLOGY: ICD-10-CM

## 2024-08-02 DIAGNOSIS — J30.9 ALLERGIC RHINITIS, UNSPECIFIED SEASONALITY, UNSPECIFIED TRIGGER: ICD-10-CM

## 2024-08-02 RX ORDER — AZELASTINE 1 MG/ML
1 SPRAY, METERED NASAL 2 TIMES DAILY
Qty: 30 ML | Refills: 3 | Status: SHIPPED | OUTPATIENT
Start: 2024-08-02

## 2024-08-02 RX ORDER — FEXOFENADINE HCL 180 MG/1
180 TABLET ORAL
Qty: 30 TABLET | Refills: 5 | Status: SHIPPED | OUTPATIENT
Start: 2024-08-02

## 2024-08-02 NOTE — PROGRESS NOTES
Consultation - Otolaryngology - Head and Neck Surgery  Facial Plastic and Reconstructive Surgery  Joana Stanton 39 y.o. female MRN: [id number]  Encounter: [id number]        Assessment/Plan:  1. Nasal congestion  azelastine (ASTELIN) 0.1 % nasal spray    fexofenadine (ALLEGRA) 180 MG tablet      2. Thyroid mass of unclear etiology  Ambulatory Referral to Otolaryngology      3. Allergic rhinitis, unspecified seasonality, unspecified trigger  azelastine (ASTELIN) 0.1 % nasal spray    fexofenadine (ALLEGRA) 180 MG tablet          Patient with a large thyroid cyst as the one on fine-needle aspiration.  On CAT scan discussed with patient that the for step would be repeating fine-needle aspiration to try and obtain a diagnosis.  We will also try and decompress the cyst at this time.  For her nasal obstruction, she does have a significant septal deviation and turbinate hypertrophy.  Will have the patient use Flonase, azelastine, and Allegra.  Will discuss allergy testing in the future depending on response to these medications.     Open the office for fine-needle aspiration.     History of Present Illness   Physician Requesting Consult: Irma San DO   Reason for Consult / Principal Problem: thyroid cyst  HPI: Joana Stanton is a 39 y.o. year old female who who is here for evaluation of thyroid mass and nasal obstruction.  Patient noticed a mass in her right neck after COVID infection.  She at first he thought this was swollen glands but then the mass did not go away.  She went to her primary care doctor who ordered an neck CT showing a 4 cm well-circumscribed mass in the thyroid lobe.  Thyroid ultrasound and FNA were done which were [place] 1, nondiagnostic.  Patient says that during the procedure they sucked off a lot of clear fluid.  At first the mass went away but now she feels it is slowly coming back.    Patient also reports that she has had nasal obstruction for many years.  She used to use Claritin.   She does do Flonase every day.  She does not have sinus infections, rhinitis, postnasal drip.    Review of systems:  10 Point ROS was performed and negative except as above or otherwise noted in the medical record.    Historical Information   Past Medical History:   Diagnosis Date    Allergic     Anxiety     Cyst of thyroid     Depression     Opioid abuse, in remission ([facility]) [phone]    Postpartum depression     Preeclampsia     hx with first pregnancy    Varicella      Past Surgical History:   Procedure Laterality Date    COLONOSCOPY      [place] HYSTEROSCOPY BX ENDOMETRIUM&/POLYPC W/WO D&C N/A 2024    Procedure: D&C HYSTEROSCOPY EXAM UNDER ANESTHESIA;  Surgeon: Evelyn Chris DO;  [facility];  Service: Gynecology    [place] HYSTEROSCOPY BX ENDOMETRIUM&/POLYPC W/WO D&C N/A 2024    Procedure: POLYPECTOMY;  Surgeon: Evelyn Chris DO;  [facility];  Service: Gynecology    US GUIDED THYROID BIOPSY  2024    WISDOM TOOTH EXTRACTION       Social History   Social History     Substance and Sexual Activity   Alcohol Use No     Social History     Substance and Sexual Activity   Drug Use No    Comment: has medical marijuana card- not using     Social History     Tobacco Use   Smoking Status Former    Current packs/day: 0.00    Types: Cigarettes    Start date: 2002    Quit date: 2019    Years since quittin.5   Smokeless Tobacco Never   Tobacco Comments    Use nicotine replacement     Family History: non-contributory    Current Outpatient Medications on File Prior to Visit   Medication Sig    ALPRAZolam (XANAX) 1 mg tablet Take 1 mg by mouth if needed    cloNIDine (CATAPRES) 0.1 mg tablet Take 0.1 mg by mouth if needed for high blood pressure    FLUoxetine (PROzac) 40 MG capsule Take 30 mg by mouth every morning Taking 30 mg daily    fluticasone (FLONASE) 50 mcg/act nasal spray INSTILL ONE SPRAY INTO EACH NOSTRIL DAILY     No current facility-administered medications on file prior to visit.        Allergies   Allergen Reactions    Eggs Or Egg-Derived Products - Food Allergy Swelling     Egg whites       Vitals:    08/02/24 1402   BP: 106/71   Pulse: 66   Temp: 98.6 °F (37 °C)   SpO2: 98%       Physical Exam   Constitutional:  Well-developed and well-nourished, no apparent distress, non-toxic appearance. Cooperative, able to hear and answer questions without difficulty.    Voice: Normal voice quality.  Head: Normocephalic, atraumatic.  No scars, masses or lesions.  Face: Symmetric, no edema, no sinus tenderness.  Ears: External ears normal. Tympanic membranes intact with intact normal landmarks.  No post-auricular erythema or tenderness.  Nose: Septum deviated right, nares clear.  Mucosa moist, turbinates well appearing.  No crusting, polyps or discharge evident.  Oral cavity: Dentition intact.  Mucosa moist, lips without lesions or masses.  Tongue mobile, floor of mouth soft and flat.  Hard palate intact.  No masses or lesions.   Oropharynx: Uvula is midline, soft palate intact without lesion or mass.  Oropharyngeal inlet without obstruction.  Tonsils unremarkable.  Posterior pharyngeal wall clear. No masses or lesions.  Salivary glands:  Parotid glands and submandibular glands symmetric, no enlargement or tenderness.  Neck/Thyroid: Palpable well-circumscribed mobile mass in right  Pulmonary/Chest: Normal effort and rate. No respiratory distress. No stertor or stridor  Musculoskeletal: Normal range of motion.   Skin: Skin is warm and dry.   Psychiatric: Normal mood and affect.        Imaging Studies: I have personally reviewed pertinent films in PACS    Lab Results: I have personally reviewed pertinent lab results.      Records reviewed:

## 2024-08-21 ENCOUNTER — NURSE TRIAGE (OUTPATIENT)
Age: 39
End: 2024-08-21
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
## 2024-09-13 ENCOUNTER — OFFICE VISIT (OUTPATIENT)
Dept: FAMILY MEDICINE CLINIC | Facility: MEDICAL CENTER | Age: 39
End: 2024-09-13
Payer: COMMERCIAL

## 2024-09-13 ENCOUNTER — APPOINTMENT (OUTPATIENT)
Dept: RADIOLOGY | Facility: MEDICAL CENTER | Age: 39
End: 2024-09-13
Payer: COMMERCIAL

## 2024-09-13 VITALS
HEART RATE: 60 BPM | SYSTOLIC BLOOD PRESSURE: 94 MMHG | DIASTOLIC BLOOD PRESSURE: 62 MMHG | RESPIRATION RATE: 17 BRPM | OXYGEN SATURATION: 99 % | BODY MASS INDEX: 25.6 KG/M2 | HEIGHT: 61 IN | TEMPERATURE: 97.6 F | WEIGHT: 135.6 LBS

## 2024-09-13 DIAGNOSIS — M54.42 CHRONIC LEFT-SIDED LOW BACK PAIN WITH LEFT-SIDED SCIATICA: ICD-10-CM

## 2024-09-13 DIAGNOSIS — G89.29 CHRONIC LEFT-SIDED LOW BACK PAIN WITH LEFT-SIDED SCIATICA: ICD-10-CM

## 2024-09-13 DIAGNOSIS — R45.86 MOOD DISTURBANCE: Primary | ICD-10-CM

## 2024-09-13 PROCEDURE — 99214 OFFICE O/P EST MOD 30 MIN: CPT | Performed by: STUDENT IN AN ORGANIZED HEALTH CARE EDUCATION/TRAINING PROGRAM

## 2024-09-13 PROCEDURE — 72100 X-RAY EXAM L-S SPINE 2/3 VWS: CPT

## 2024-09-13 RX ORDER — ARIPIPRAZOLE 2 MG/1
2 TABLET ORAL EVERY MORNING
COMMUNITY
Start: 2024-08-30 | End: 2024-09-13

## 2024-09-13 NOTE — PATIENT INSTRUCTIONS
"Patient Education     Back exercises   The Basics   Written by the doctors and editors at [facility]   Why do I need to do back exercises? -- Back exercises can help ease back pain and might help prevent future back pain. Long term, it is important to strengthen the muscles in your lower back, buttocks, and belly. These are your \"core muscles.\" Stretching exercises are also important to keep your muscles flexible.  Below are some stretching and strengthening exercises that might help you. Other forms of movement can help ease or prevent back pain, too. For example, some people like to walk, do aerobic exercise, or do yoga or kira chi. The most important thing is to move your body. Your doctor, nurse, or physical therapist can help you find different types of activity that work for you.  What stretching exercises should I do? -- Below are some examples of stretching exercises. Warm up your muscles before stretching to help prevent injury. To warm up, you can walk, jog, or cycle for a few minutes.  Start by repeating each of these stretches 2 to 3 times. Try to hold each stretch for 5 to 10 seconds, and try to do the stretches 2 to 3 times each day. Breathe slowly and deeply as you do the exercises. Never bounce when doing stretches.   Cat-cow stretch (figure 1) - Start on all fours on the floor, with your hands under your shoulders, knees under your hips, and your back flat. First, tuck your chin and tighten your stomach muscles as you round your back (like a \"cat\"). Hold the stretch for about 10 seconds. Rest for a few seconds as you flatten your back. Next, lift your chin and let your belly and lower back sink toward the floor (like a \"cow\"). Hold the stretch for about 10 seconds.   Single knee-to-chest stretches (figure 2) ? While lying on your back, bend your knees with your feet flat on the floor. Pull 1 knee toward your chest until you feel a stretch in your lower back and buttock area. Lower, and repeat with the " other knee. If you have knee problems, pull your knee up by grabbing the back of your thigh instead of the front of your knee. You can also do this exercise by grabbing both knees at the same time.   Lower trunk rotations (figure 3) ? While lying on your back, bend your knees with your feet flat on the floor. Keep your knees and ankles together, and then drop them to 1 side. Keep both of your shoulders touching the floor until you feel a stretch in the muscles at the side of the back. Repeat on the other side.   Lower back stretches seated (figure 4) ? Sit in a chair with your feet spread about shoulder width apart. Then, lean forward until you feel a stretch in your lower back.  What strengthening exercises should I do? -- Below are some examples of strengthening exercises.  Start by doing each exercise 2 to 3 times. Work up to doing each exercise 10 times. Hold each exercise for 3 to 5 seconds, and try to do the exercises 2 to 3 times each day. Do all exercises slowly.   Shoulder blade squeezes (figure 5) ? Pinch your shoulder blades together on your upper back, and hold 3 to 5 seconds. You can also do these 1 side at a time. Sit with good posture, and make sure that your shoulders do not rise up when you do this exercise. Relax.   Pelvic tilts (figure 6) ? Lie on your back with your knees bent and feet flat on the floor. Tighten your stomach muscles, and press your lower back down to the floor. Relax. You should be able to breathe comfortably during this exercise.   Hip lifts (figure 7) ? Lie on your back with your knees bent and feet flat on the floor. Tighten your stomach muscles, keep your back flat, and lift your buttocks off of the floor. Relax. You should feel this in your buttocks, not in your lower back.  What else should I know?    Exercise, including stretching, might be slightly uncomfortable. But you should not have sharp or severe pain. If you do get severe pain, stop what you are doing. If severe  "pain continues, call your doctor or nurse.   Do not hold your breath when exercising. If you tend to hold your breath, try counting out loud when exercising. If any exercise bothers you, stop right away.   Always warm up before exercising. Warm muscles stretch much easier than cool muscles. Stretching cool muscles can lead to injury.   Doing exercises before a meal can be a good way to get into a routine.  All topics are updated as new evidence becomes available and our peer review process is complete.  This topic retrieved from [employer] on: Apr 03, 2024.  Topic 317280 Version 2.0  Release: 32.2.4 - C32.92  © 2024 UpToDate, Inc. and/or its affiliates. All rights reserved.  figure 1: Cat-cow stretch     Start on all fours on the floor, with hands under your shoulders, knees under your hips, and your back flat. First, tuck your chin and tighten your stomach muscles as you round your back (like a \"cat\"). Hold the stretch for about 10 seconds. Rest for a few seconds as you flatten your back. Next, lift your chin and let your belly and lower back sink toward the floor (like a \"cow\"). Hold the stretch for about 10 seconds.  Graphic [id number] Version 1.0  figure 2: Single knee-to-chest stretch     Lie on your back, bend your knees, and have your feet flat on the floor. Pull 1 knee toward your chest until you feel a stretch in your lower back and buttock area. Repeat with the other knee. If you have knee problems, pull your knee up by grabbing the back of your thigh instead of the front of your knee. You can also do this exercise by grabbing both knees at the same time.  Graphic [id number] Version 1.0  figure 3: Lower trunk rotation     While lying on your back, bend your knees and have your feet flat on the floor. Keep your legs together and then drop them to 1 side. Keep both of your shoulders touching the floor until you feel a stretch in the muscles at the side of the back. Repeat on the other side.  Graphic [id number] Version " 1.0  figure 4: Lower back stretch     Sit in a chair with your feet spread about shoulder width apart. Then, lean forward until you feel a stretch in your lower back.  Graphic [id number] Version 1.0  figure 5: Shoulder blade squeezes     Pinch your shoulder blades together on your upper back and hold for 3 to 5 seconds. Make sure that you are sitting with good posture and that your shoulders do not raise up when you do this exercise. Relax.  Graphic [id number] Version 1.0  figure 6: Pelvic tilts     Lie on your back with your knees bent and feet flat on the floor. Tighten your stomach muscles and press your lower back down to the floor. Relax.  Graphic [id number] Version 1.0  figure 7: Hip lifts     Lie on your back with your knees bent and feet flat on the floor. Tighten your stomach muscles and lift your buttocks off of the floor. Relax.  Graphic [id number] Version 1.0  Consumer Information Use and Disclaimer   Disclaimer: This generalized information is a limited summary of diagnosis, treatment, and/or medication information. It is not meant to be comprehensive and should be used as a tool to help the user understand and/or assess potential diagnostic and treatment options. It does NOT include all information about conditions, treatments, medications, side effects, or risks that may apply to a specific patient. It is not intended to be medical advice or a substitute for the medical advice, diagnosis, or treatment of a health care provider based on the health care provider's examination and assessment of a patient's specific and unique circumstances. Patients must speak with a health care provider for complete information about their health, medical questions, and treatment options, including any risks or benefits regarding use of medications. This information does not endorse any treatments or medications as safe, effective, or approved for treating a specific patient. UpToDate, Inc. and its affiliates disclaim any warranty or  liability relating to this information or the use thereof.The use of this information is governed by the Terms of Use, available at https://www.wolterskluwer.com/en/know/clinical-effectiveness-terms. 2024© UpToDate, Inc. and its affiliates and/or licensors. All rights reserved.  Copyright   © 2024 [employer] and/or its affiliates. All rights reserved.

## 2024-09-13 NOTE — PROGRESS NOTES
"  [facility] - Clinic Note  Irma San DO, 24     Joana Stanton MRN: [id number] : 1985 Age: 39 y.o.     Assessment/Plan     1. Mood disturbance    -Discussed with patient mood disruptions could certainly be related to weaning off of prior medication and starting new medication  -Her periods at this time are regular, low suspicion perimenopausal however, will check FSH  - Follicle stimulating hormone; Future    2. Chronic left-sided low back pain with left-sided sciatica    -Advised about topical heat, Lidoderm patch, stretches, exercises provided  -Follow-up imaging  - XR spine lumbar 2 or 3 views injury; Future  -Advised to make appoint with orthopedics, standing order from knee pain      Joana Stanton acknowledged understanding of treatment plan, all questions answered.    Subjective      Joana Stanton is a 39 y.o. female who presents for acute visit.  She complains of left lower back pain, brain fog and she expresses concern about perimenopausal symptoms.  She states her psychiatrist wanted her to get checked.  Patient mentions her psychiatrist weaned her off of Prozac and she is now on Zoloft.  She weaned off of Prozac about 2 weeks ago.  Patient states she is having menstrual flow every month \" like clockwork\".  She does endorse mood disruptions.\"I think I do have hot flashes at times\".  Low back pain is not a new issue for patient, onset about 2 years ago.  She describes she does experience sciatica symptoms.  She denies any numbness or weakness of lower extremities.  No urinary incontinence, bowel incontinence or bowel anesthesia.  Patient also mentions persistent right knee pain that radiates up and down her lower extremity.    The following portions of the patient's history were reviewed and updated as appropriate: allergies, current medications, past family history, past medical history, past social history, past surgical history and problem list.     Past " Medical History:   Diagnosis Date    Allergic     Anxiety     Cyst of thyroid     Depression     Opioid abuse, in remission (HCC) [id number]    Postpartum depression     Preeclampsia     hx with first pregnancy    Varicella        Allergies   Allergen Reactions    Eggs Or Egg-Derived Products - Food Allergy Swelling     Egg whites       Past Surgical History:   Procedure Laterality Date    COLONOSCOPY      IA HYSTEROSCOPY BX ENDOMETRIUM&/POLYPC W/WO D&C N/A 2024    Procedure: D&C HYSTEROSCOPY EXAM UNDER ANESTHESIA;  Surgeon: Evelyn Chris DO;  Location: [facility];  Service: Gynecology    IA HYSTEROSCOPY BX ENDOMETRIUM&/POLYPC W/WO D&C N/A 2024    Procedure: POLYPECTOMY;  Surgeon: Evelyn Chris DO;  Location: [facility];  Service: Gynecology    US GUIDED THYROID BIOPSY  2024    WISDOM TOOTH EXTRACTION         Family History   Problem Relation Age of Onset    Hyperlipidemia Mother     Hyperlipidemia Father     COPD Father     Vascular Disease Daughter     No Known Problems Daughter        Social History     Socioeconomic History    Marital status: /Civil Union     Spouse name: None    Number of children: None    Years of education: None    Highest education level: None   Occupational History    None   Tobacco Use    Smoking status: Former     Current packs/day: 0.00     Types: Cigarettes     Start date: 2002     Quit date: 2019     Years since quittin.7    Smokeless tobacco: Never    Tobacco comments:     Use nicotine replacement   Vaping Use    Vaping status: Former    Start date: 2016    Quit date: 2019   Substance and Sexual Activity    Alcohol use: No    Drug use: No     Comment: has medical marijuana card- not using    Sexual activity: Yes     Partners: Male     Birth control/protection: Male Sterilization, Coitus interruptus   Other Topics Concern    None   Social History Narrative    None     Social Determinants of Health     Financial Resource Strain: Not on file  "  Food Insecurity: Not on file   Transportation Needs: Not on file   Physical Activity: Not on file   Stress: Not on file   Social Connections: Not on file   Intimate Partner Violence: Not on file   Housing Stability: Not on file       Current Outpatient Medications   Medication Sig Dispense Refill    ALPRAZolam (XANAX) 1 mg tablet Take 1 mg by mouth if needed      azelastine (ASTELIN) 0.1 % nasal spray 1 spray into each nostril 2 (two) times a day Use in each nostril as directed 30 mL 3    cloNIDine (CATAPRES) 0.1 mg tablet Take 0.1 mg by mouth if needed for high blood pressure      fexofenadine (ALLEGRA) 180 MG tablet Take 1 tablet (180 mg total) by mouth daily at bedtime 30 tablet 5    fluticasone (FLONASE) 50 mcg/act nasal spray INSTILL ONE SPRAY INTO EACH NOSTRIL DAILY 16 g 1    sertraline (ZOLOFT) 50 mg tablet TAKE 1 TABLET BY MOUTH ONCE A DAY TAKE 1 BY MOUTH FOR 1 WEEK THEN INCREASE TO 2 BY MOUTH IN THE MORNING.      ARIPiprazole (ABILIFY) 2 mg tablet Take 2 mg by mouth every morning (Patient not taking: Reported on 9/13/2024)      FLUoxetine (PROzac) 40 MG capsule Take 30 mg by mouth every morning Taking 30 mg daily       No current facility-administered medications for this visit.       Review of Systems     As noted in HPI    Objective      BP 94/62 (BP Location: Left arm, Patient Position: Sitting, Cuff Size: Standard)   Pulse 60   Temp 97.6 °F (36.4 °C) (Temporal)   Resp 17   Ht 5' 1\" (1.549 m)   Wt 61.5 kg (135 lb 9.6 oz)   SpO2 99%   BMI 25.62 kg/m²     Physical Exam  Vitals reviewed.   Constitutional:       General: She is not in acute distress.     Appearance: Normal appearance.   HENT:      Head: Normocephalic and atraumatic.   Eyes:      Conjunctiva/sclera: Conjunctivae normal.   Musculoskeletal:      Lumbar back: Tenderness present. No deformity or bony tenderness. Decreased range of motion (with extension).        Back:       Right lower leg: No edema.      Left lower leg: No edema.      " "Comments: Tight lumbar musculature   Skin:     General: Skin is warm and dry.   Neurological:      Mental Status: She is alert and oriented to person, place, and time.      Comments: 5 out of 5 muscle in bilateral lower extremities, sensation to light touch intact bilateral lower extremities   Psychiatric:         Mood and Affect: Affect is tearful.             Some portions of this record may have been generated with voice recognition software. There may be translation, syntax, or grammatical errors. Occasional wrong word or \"sound-a-like\" substitutions may have occurred due to the inherent limitations of the voice recognition software. Read the chart carefully and recognize, using context, where substations may have occurred. If you have any questions, please contact the dictating provider for clarification or correction, as needed.  "

## 2024-09-20 ENCOUNTER — TELEPHONE (OUTPATIENT)
Age: 39
End: 2024-09-20

## 2024-09-20 DIAGNOSIS — F32.A DEPRESSION, UNSPECIFIED DEPRESSION TYPE: Primary | ICD-10-CM

## 2024-09-20 DIAGNOSIS — F41.9 ANXIETY: ICD-10-CM

## 2024-09-20 RX ORDER — SERTRALINE HYDROCHLORIDE 100 MG/1
100 TABLET, FILM COATED ORAL DAILY
Qty: 30 TABLET | Refills: 0 | Status: SHIPPED | OUTPATIENT
Start: 2024-09-20 | End: 2024-10-17

## 2024-09-20 NOTE — TELEPHONE ENCOUNTER
Patient has run short by 1 week of Zoloft 100mg which is typically prescribed by her psychiatrist. She was supposed to take 1 per day for the first week and then 2 per day thereafter but they only dispensed 30 pills. She can't reach the psychiatrist today and asked if her PCP could send a short supply to [facility].

## 2024-10-17 DIAGNOSIS — F41.9 ANXIETY: ICD-10-CM

## 2024-10-17 DIAGNOSIS — F32.A DEPRESSION, UNSPECIFIED DEPRESSION TYPE: ICD-10-CM

## 2024-10-17 RX ORDER — SERTRALINE HYDROCHLORIDE 100 MG/1
100 TABLET, FILM COATED ORAL DAILY
Qty: 30 TABLET | Refills: 5 | Status: SHIPPED | OUTPATIENT
Start: 2024-10-17

## 2024-10-23 ENCOUNTER — APPOINTMENT (OUTPATIENT)
Dept: LAB | Facility: MEDICAL CENTER | Age: 39
End: 2024-10-23
Payer: COMMERCIAL

## 2024-10-23 DIAGNOSIS — Z13.9 SCREENING FOR UNSPECIFIED CONDITION: ICD-10-CM

## 2024-10-23 DIAGNOSIS — E55.9 VITAMIN D2 DEFICIENCY: ICD-10-CM

## 2024-10-23 DIAGNOSIS — Z79.899 ENCOUNTER FOR LONG-TERM (CURRENT) USE OF MEDICATIONS: ICD-10-CM

## 2024-10-23 DIAGNOSIS — R45.86 MOOD DISTURBANCE: ICD-10-CM

## 2024-10-23 LAB
FOLATE SERPL-MCNC: 17 NG/ML
FSH SERPL-ACNC: 8.1 MIU/ML

## 2024-10-23 PROCEDURE — 83001 ASSAY OF GONADOTROPIN (FSH): CPT

## 2024-10-23 PROCEDURE — 82746 ASSAY OF FOLIC ACID SERUM: CPT

## 2025-01-30 ENCOUNTER — TELEPHONE (OUTPATIENT)
Dept: INTERNAL MEDICINE CLINIC | Facility: CLINIC | Age: 40
End: 2025-01-30

## 2025-01-30 DIAGNOSIS — E07.89 THYROID MASS OF UNCLEAR ETIOLOGY: Primary | ICD-10-CM

## 2025-01-30 DIAGNOSIS — R09.81 NASAL CONGESTION: ICD-10-CM

## 2025-01-30 DIAGNOSIS — J30.9 ALLERGIC RHINITIS, UNSPECIFIED SEASONALITY, UNSPECIFIED TRIGGER: ICD-10-CM

## 2025-01-30 RX ORDER — AZELASTINE HYDROCHLORIDE 137 UG/1
1 SPRAY, METERED NASAL 2 TIMES DAILY
Qty: 30 ML | Refills: 3 | Status: SHIPPED | OUTPATIENT
Start: 2025-01-30

## 2025-03-28 ENCOUNTER — OFFICE VISIT (OUTPATIENT)
Dept: MULTI SPECIALTY CLINIC | Facility: CLINIC | Age: 40
End: 2025-03-28

## 2025-03-28 VITALS
TEMPERATURE: 97.5 F | BODY MASS INDEX: 25.81 KG/M2 | WEIGHT: 136.6 LBS | SYSTOLIC BLOOD PRESSURE: 104 MMHG | DIASTOLIC BLOOD PRESSURE: 72 MMHG | OXYGEN SATURATION: 98 % | HEART RATE: 65 BPM

## 2025-03-28 DIAGNOSIS — E04.1 THYROID CYST: Primary | ICD-10-CM

## 2025-03-28 DIAGNOSIS — E07.89 THYROID MASS OF UNCLEAR ETIOLOGY: ICD-10-CM

## 2025-03-28 NOTE — PROGRESS NOTES
Consultation - Otolaryngology - Head and Neck Surgery  Facial Plastic and Reconstructive Surgery  Joana Stanton 39 y.o. female MRN: [id number]  Encounter: [id number]        Assessment/Plan:  1. Thyroid cyst  US guided thyroid biopsy with molecular testing    US thyroid      2. Thyroid mass of unclear etiology  Ambulatory Referral to Otolaryngology    US guided thyroid biopsy with molecular testing    US thyroid          Patient with a large thyroid cyst although seems stable, but still palpable on exam.  On CAT scan discussed with patient that the for step would be repeating fine-needle aspiration to try and obtain a diagnosis.       Rare possibility of thyroid cancer within a large cyst. However risk of vocal cord injury is also a risk to lobectomy, so will start with U/S thyroid repeat and FNAB under U/S guidance with aspiration.  Will need f/u with either Dr. Patel or Dr. Blair post procedure.  Dr. Chandra was also here and saw the patient    Other considerations: ablation of thyroid nodule?    History of Present Illness   Physician Requesting Consult: Irma San,    Reason for Consult / Principal Problem: thyroid cyst  HPI: Joana Stanton is a 39 y.o. year old female who who is here for evaluation of thyroid mass and nasal obstruction.  Patient noticed a mass in her right neck after COVID infection about 1.5 years ago.   She at first he thought this was swollen glands but then the mass did not go away.  She went to her primary care doctor who ordered an neck CT showing a 4 cm well-circumscribed mass in the thyroid lobe.  Thyroid ultrasound and FNA were done which were [place] 1, nondiagnostic.  Patient says that during the procedure they sucked off a lot of clear fluid.  At first the mass went away but now she feels it is slowly coming back.  She was supposed to have repeat FNAB right side after last visit, but she did not have it done. States scheduling issues.     Pressure right side of face, and  right ear pain at times. No dysphagia, or hoarseness, sore throat.  No compression symptoms.  She did notice compression symptoms when this first started however.    Last tsh 10/2024.  No FH of thyroid cancer.    .    Historical Information   Past Medical History:   Diagnosis Date    Allergic     Anxiety     Cyst of thyroid     Depression     Opioid abuse, in remission (HCC) [id number]    Postpartum depression     Preeclampsia     hx with first pregnancy    Varicella      Past Surgical History:   Procedure Laterality Date    COLONOSCOPY      NE HYSTEROSCOPY BX ENDOMETRIUM&/POLYPC W/WO D&C N/A 2024    Procedure: D&C HYSTEROSCOPY EXAM UNDER ANESTHESIA;  Surgeon: Evelyn Chris DO;  [facility];  Service: Gynecology    NE HYSTEROSCOPY BX ENDOMETRIUM&/POLYPC W/WO D&C N/A 2024    Procedure: POLYPECTOMY;  Surgeon: Evelyn Chris DO;  [facility];  Service: Gynecology    US GUIDED THYROID BIOPSY  2024    WISDOM TOOTH EXTRACTION       Social History   Social History     Substance and Sexual Activity   Alcohol Use No     Social History     Substance and Sexual Activity   Drug Use No    Comment: has medical marijuana card- not using     Social History     Tobacco Use   Smoking Status Former    Current packs/day: 0.00    Types: Cigarettes    Start date: 2002    Quit date: 2019    Years since quittin.2   Smokeless Tobacco Never   Tobacco Comments    Use nicotine replacement     Family History: non-contributory    Current Outpatient Medications on File Prior to Visit   Medication Sig    ALPRAZolam (XANAX) 1 mg tablet Take 1 mg by mouth if needed    Azelastine HCl 137 MCG/SPRAY SOLN INSTILL 1 SPRAY INTO EACH NOSTRIL 2 (TWO) TIMES A DAY USE IN EACH NOSTRIL AS DIRECTED    cloNIDine (CATAPRES) 0.1 mg tablet Take 0.1 mg by mouth if needed for high blood pressure    fexofenadine (ALLEGRA) 180 MG tablet Take 1 tablet (180 mg total) by mouth daily at bedtime    fluticasone (FLONASE) 50  mcg/act nasal spray INSTILL ONE SPRAY INTO EACH NOSTRIL DAILY    sertraline (ZOLOFT) 100 mg tablet TAKE 1 TABLET BY MOUTH EVERY DAY     No current facility-administered medications on file prior to visit.       Allergies   Allergen Reactions    Eggs Or Egg-Derived Products - Food Allergy Swelling     Egg whites       Vitals:    03/28/25 1545   BP: 104/72   Pulse: 65   Temp: 97.5 °F (36.4 °C)   SpO2: 98%       Physical Exam   Constitutional:  Well-developed and well-nourished, no apparent distress, non-toxic appearance. Cooperative, able to hear and answer questions without difficulty.    Voice: Normal voice quality.  Head: Normocephalic, atraumatic.  No scars, masses or lesions.  Face: Symmetric, no edema, no sinus tenderness.  Ears: External ears normal. Tympanic membranes intact with intact normal landmarks.  No post-auricular erythema or tenderness.  Nose: Septum deviated right, nares clear.  Mucosa moist, turbinates well appearing.  No crusting, polyps or discharge evident.  Oral cavity: Dentition intact.  Mucosa moist, lips without lesions or masses.  Tongue mobile, floor of mouth soft and flat.  Hard palate intact.  No masses or lesions.   Oropharynx: Uvula is midline, soft palate intact without lesion or mass.  Oropharyngeal inlet without obstruction.  Tonsils unremarkable.  Posterior pharyngeal wall clear. No masses or lesions.  Salivary glands:  Parotid glands and submandibular glands symmetric, no enlargement or tenderness.  Neck/Thyroid: Palpable well-circumscribed mobile mass in right thyroid lobe, nontender.  Pulmonary/Chest: Normal effort and rate. No respiratory distress. No stertor or stridor  Musculoskeletal: Normal range of motion.   Skin: Skin is warm and dry.   Psychiatric: Normal mood and affect.        Imaging Studies: I have personally reviewed pertinent films in PACS    Lab Results: I have personally reviewed pertinent lab results.      Records reviewed:

## 2025-03-31 ENCOUNTER — TELEMEDICINE (OUTPATIENT)
Dept: OTHER | Facility: HOSPITAL | Age: 40
End: 2025-03-31
Payer: COMMERCIAL

## 2025-03-31 DIAGNOSIS — K52.9 GASTROENTERITIS: Primary | ICD-10-CM

## 2025-03-31 PROCEDURE — 99213 OFFICE O/P EST LOW 20 MIN: CPT | Performed by: NURSE PRACTITIONER

## 2025-03-31 RX ORDER — ONDANSETRON 4 MG/1
4 TABLET, FILM COATED ORAL EVERY 8 HOURS PRN
Qty: 9 TABLET | Refills: 0 | Status: SHIPPED | OUTPATIENT
Start: 2025-03-31 | End: 2025-04-03

## 2025-03-31 NOTE — PROGRESS NOTES
Virtual Regular Visit  Name: Joana Stanton      : 1985      MRN: [id number]  Encounter Provider: LEANNE Gay  Encounter Date: 3/31/2025   Encounter department: VIRTUAL CARE       Verification of patient location:  Patient is located at Home in the following state in which I hold an active license [place] :  Assessment & Plan  Gastroenteritis    Orders:    ondansetron (ZOFRAN) 4 mg tablet; Take 1 tablet (4 mg total) by mouth every 8 (eight) hours as needed for nausea or vomiting for up to 3 days        Encounter provider LEANNE Gay    The patient was identified by name and date of birth. Joana Stanton was informed that this is a telemedicine visit and that the visit is being conducted through the Epic Embedded platform. She agrees to proceed..  My office door was closed. No one else was in the room.  She acknowledged consent and understanding of privacy and security of the video platform. The patient has agreed to participate and understands they can discontinue the visit at any time.    Patient is aware this is a billable service.     History obtained from: patient  History of Present Illness     This is a 39 year old female here today for vidoe visit.  She states she started to not feel unwell when she woke up.  She states her daughter had GI bug several days ago.  She states she is having nausea but no diarrhea.  She has felt like she may be starting with diarrhea.  Slight abd pain.  She denies any pain with palpation.       Review of Systems   Constitutional:  Positive for fatigue. Negative for activity change and chills.   Gastrointestinal:  Positive for abdominal pain, diarrhea, nausea and vomiting.   Psychiatric/Behavioral: Negative.         Objective   There were no vitals taken for this visit.    Physical Exam  Constitutional:       General: She is not in acute distress.     Appearance: She is not ill-appearing or toxic-appearing.   HENT:      Head: Normocephalic and  atraumatic.   Pulmonary:      Effort: Pulmonary effort is normal.   Abdominal:      Tenderness: There is no abdominal tenderness.   Neurological:      Mental Status: She is alert and oriented to person, place, and time.   Psychiatric:         Mood and Affect: Mood normal.         Behavior: Behavior normal.         Thought Content: Thought content normal.         Judgment: Judgment normal.         Visit Time  Total Visit Duration: 5 minutes not including the time spent for establishing the audio/video connection.

## 2025-04-07 ENCOUNTER — HOSPITAL ENCOUNTER (OUTPATIENT)
Dept: ULTRASOUND IMAGING | Facility: CLINIC | Age: 40
Discharge: HOME | End: 2025-04-07
Payer: COMMERCIAL

## 2025-04-07 DIAGNOSIS — E04.1 THYROID CYST: ICD-10-CM

## 2025-04-07 DIAGNOSIS — E07.89 THYROID MASS OF UNCLEAR ETIOLOGY: ICD-10-CM

## 2025-04-07 PROCEDURE — 76536 US EXAM OF HEAD AND NECK: CPT

## 2025-04-17 ENCOUNTER — HOSPITAL ENCOUNTER (OUTPATIENT)
Dept: ULTRASOUND IMAGING | Facility: HOSPITAL | Age: 40
End: 2025-04-17
Payer: COMMERCIAL

## 2025-04-17 DIAGNOSIS — E07.89 THYROID MASS OF UNCLEAR ETIOLOGY: ICD-10-CM

## 2025-04-17 DIAGNOSIS — E04.1 THYROID CYST: ICD-10-CM

## 2025-04-17 PROCEDURE — 10005 FNA BX W/US GDN 1ST LES: CPT

## 2025-04-17 PROCEDURE — 88173 CYTOPATH EVAL FNA REPORT: CPT | Performed by: PATHOLOGY

## 2025-04-17 RX ORDER — LIDOCAINE HYDROCHLORIDE 10 MG/ML
5 INJECTION, SOLUTION EPIDURAL; INFILTRATION; INTRACAUDAL; PERINEURAL ONCE
Status: DISCONTINUED | OUTPATIENT
Start: 2025-04-17 | End: 2025-04-21 | Stop reason: HOSPADM

## 2025-04-22 PROCEDURE — 88173 CYTOPATH EVAL FNA REPORT: CPT | Performed by: PATHOLOGY

## 2025-04-23 ENCOUNTER — RESULTS FOLLOW-UP (OUTPATIENT)
Dept: INTERNAL MEDICINE CLINIC | Facility: CLINIC | Age: 40
End: 2025-04-23

## 2025-06-13 ENCOUNTER — DOCUMENTATION (OUTPATIENT)
Dept: ADMINISTRATIVE | Facility: OTHER | Age: 40
End: 2025-06-13

## 2025-06-13 NOTE — PROGRESS NOTES
06/13/25 8:43 AM     Chart reviewed for Pap Smear (HPV) aka Cervical Cancer Screening ; nothing is submitted to the patient's insurance at this time.     Lubna Sweeney MA   [facility]

## (undated) DEVICE — STRL ALLENTOWN HYSTEROSCOPY PK: Brand: CARDINAL HEALTH

## (undated) DEVICE — TISSUE REMOVAL SYSTEM FLUID MANAGEMENT ACCESSORIES: Brand: SYMPHION

## (undated) DEVICE — SPONGE LAP 18 X 18 IN

## (undated) DEVICE — TISSUE REMOVAL SYSTEM RESECTING DEVICE: Brand: SYMPHION

## (undated) DEVICE — DRAPE EQUIPMENT RF WAND

## (undated) DEVICE — LIGHT GLOVE GREEN

## (undated) DEVICE — 4-PORT MANIFOLD: Brand: NEPTUNE 2

## (undated) DEVICE — CHLORHEXIDINE 4PCT 4 OZ

## (undated) DEVICE — GLOVE INDICATOR UNDERGLOVE SZ 6 BLUE

## (undated) DEVICE — CYSTO TUBING SINGLE IRRIGATION

## (undated) DEVICE — NEEDLE SPINAL18G X 3.5 IN QUINCKE

## (undated) DEVICE — PAD GROUNDING DUAL ADULT

## (undated) DEVICE — SCD SEQUENTIAL COMPRESSION COMFORT SLEEVE MEDIUM KNEE LENGTH: Brand: KENDALL SCD

## (undated) DEVICE — PVC URETHRAL CATHETER: Brand: DOVER

## (undated) DEVICE — SPONGE 4 X 4 XRAY 16 PLY STRL LF RFD

## (undated) DEVICE — SPECIMEN SOCK - SHORT: Brand: MEDI-VAC

## (undated) DEVICE — MAYO STAND COVER: Brand: CONVERTORS

## (undated) DEVICE — SYRINGE 10ML LL

## (undated) DEVICE — GLOVE INDICATOR PI UNDERGLOVE SZ 6.5 BLUE